# Patient Record
Sex: MALE | Race: WHITE | NOT HISPANIC OR LATINO | URBAN - METROPOLITAN AREA
[De-identification: names, ages, dates, MRNs, and addresses within clinical notes are randomized per-mention and may not be internally consistent; named-entity substitution may affect disease eponyms.]

---

## 2022-12-01 ENCOUNTER — EMERGENCY (EMERGENCY)
Facility: HOSPITAL | Age: 77
LOS: 1 days | Discharge: HOME | End: 2022-12-01
Attending: EMERGENCY MEDICINE
Payer: MEDICARE

## 2022-12-01 VITALS
OXYGEN SATURATION: 94 % | TEMPERATURE: 99 F | RESPIRATION RATE: 20 BRPM | HEART RATE: 92 BPM | WEIGHT: 195.11 LBS | SYSTOLIC BLOOD PRESSURE: 135 MMHG | DIASTOLIC BLOOD PRESSURE: 72 MMHG

## 2022-12-01 LAB
ALBUMIN SERPL ELPH-MCNC: 3.4 G/DL — LOW (ref 3.5–5)
ALP SERPL-CCNC: 58 U/L — SIGNIFICANT CHANGE UP (ref 40–120)
ALT FLD-CCNC: 30 U/L DA — SIGNIFICANT CHANGE UP (ref 10–60)
ANION GAP SERPL CALC-SCNC: 11 MMOL/L — SIGNIFICANT CHANGE UP (ref 5–17)
APTT BLD: 23.5 SEC — LOW (ref 27.5–35.5)
AST SERPL-CCNC: 26 U/L — SIGNIFICANT CHANGE UP (ref 10–40)
BILIRUB SERPL-MCNC: 0.3 MG/DL — SIGNIFICANT CHANGE UP (ref 0.2–1.2)
BUN SERPL-MCNC: 21 MG/DL — HIGH (ref 7–18)
CALCIUM SERPL-MCNC: 9.5 MG/DL — SIGNIFICANT CHANGE UP (ref 8.4–10.5)
CHLORIDE SERPL-SCNC: 98 MMOL/L — SIGNIFICANT CHANGE UP (ref 96–108)
CO2 SERPL-SCNC: 23 MMOL/L — SIGNIFICANT CHANGE UP (ref 22–31)
CREAT SERPL-MCNC: 1.38 MG/DL — HIGH (ref 0.5–1.3)
EGFR: 52 ML/MIN/1.73M2 — LOW
GLUCOSE SERPL-MCNC: 148 MG/DL — HIGH (ref 70–99)
HCT VFR BLD CALC: 31.9 % — LOW (ref 39–50)
HGB BLD-MCNC: 10.5 G/DL — LOW (ref 13–17)
INR BLD: 1.12 RATIO — SIGNIFICANT CHANGE UP (ref 0.88–1.16)
LACTATE SERPL-SCNC: 1.8 MMOL/L — SIGNIFICANT CHANGE UP (ref 0.7–2)
MCHC RBC-ENTMCNC: 28.4 PG — SIGNIFICANT CHANGE UP (ref 27–34)
MCHC RBC-ENTMCNC: 32.9 GM/DL — SIGNIFICANT CHANGE UP (ref 32–36)
MCV RBC AUTO: 86.2 FL — SIGNIFICANT CHANGE UP (ref 80–100)
NRBC # BLD: 0 /100 WBCS — SIGNIFICANT CHANGE UP (ref 0–0)
PLATELET # BLD AUTO: 264 K/UL — SIGNIFICANT CHANGE UP (ref 150–400)
POTASSIUM SERPL-MCNC: 4.6 MMOL/L — SIGNIFICANT CHANGE UP (ref 3.5–5.3)
POTASSIUM SERPL-SCNC: 4.6 MMOL/L — SIGNIFICANT CHANGE UP (ref 3.5–5.3)
PROT SERPL-MCNC: 8.6 G/DL — HIGH (ref 6–8.3)
PROTHROM AB SERPL-ACNC: 13.3 SEC — SIGNIFICANT CHANGE UP (ref 10.5–13.4)
RBC # BLD: 3.7 M/UL — LOW (ref 4.2–5.8)
RBC # FLD: 12.3 % — SIGNIFICANT CHANGE UP (ref 10.3–14.5)
SODIUM SERPL-SCNC: 132 MMOL/L — LOW (ref 135–145)
WBC # BLD: 9.16 K/UL — SIGNIFICANT CHANGE UP (ref 3.8–10.5)
WBC # FLD AUTO: 9.16 K/UL — SIGNIFICANT CHANGE UP (ref 3.8–10.5)

## 2022-12-01 PROCEDURE — 36415 COLL VENOUS BLD VENIPUNCTURE: CPT

## 2022-12-01 PROCEDURE — 99284 EMERGENCY DEPT VISIT MOD MDM: CPT | Mod: CS

## 2022-12-01 PROCEDURE — 93010 ELECTROCARDIOGRAM REPORT: CPT

## 2022-12-01 PROCEDURE — 99285 EMERGENCY DEPT VISIT HI MDM: CPT | Mod: 25

## 2022-12-01 PROCEDURE — 85610 PROTHROMBIN TIME: CPT

## 2022-12-01 PROCEDURE — 80053 COMPREHEN METABOLIC PANEL: CPT

## 2022-12-01 PROCEDURE — 83605 ASSAY OF LACTIC ACID: CPT

## 2022-12-01 PROCEDURE — 85027 COMPLETE CBC AUTOMATED: CPT

## 2022-12-01 PROCEDURE — 85730 THROMBOPLASTIN TIME PARTIAL: CPT

## 2022-12-01 PROCEDURE — 71045 X-RAY EXAM CHEST 1 VIEW: CPT | Mod: 26

## 2022-12-01 PROCEDURE — 71045 X-RAY EXAM CHEST 1 VIEW: CPT

## 2022-12-01 PROCEDURE — 93005 ELECTROCARDIOGRAM TRACING: CPT

## 2022-12-01 RX ORDER — ACETAMINOPHEN 500 MG
975 TABLET ORAL ONCE
Refills: 0 | Status: COMPLETED | OUTPATIENT
Start: 2022-12-01 | End: 2022-12-01

## 2022-12-01 RX ORDER — SODIUM CHLORIDE 9 MG/ML
1000 INJECTION INTRAMUSCULAR; INTRAVENOUS; SUBCUTANEOUS ONCE
Refills: 0 | Status: COMPLETED | OUTPATIENT
Start: 2022-12-01 | End: 2022-12-01

## 2022-12-01 RX ADMIN — SODIUM CHLORIDE 1000 MILLILITER(S): 9 INJECTION INTRAMUSCULAR; INTRAVENOUS; SUBCUTANEOUS at 21:14

## 2022-12-01 RX ADMIN — Medication 975 MILLIGRAM(S): at 21:13

## 2022-12-01 NOTE — ED PROVIDER NOTE - PATIENT PORTAL LINK FT
You can access the FollowMyHealth Patient Portal offered by [facility] by registering at the following website: [email or web address]. By joining [facility]’s FollowMyHealth portal, you will also be able to view your health information using other applications (apps) compatible with our system.

## 2022-12-01 NOTE — ED PROVIDER NOTE - PROGRESS NOTE DETAILS
EKG - nsr, rate 96, QTc 419, no ischemic changes, no ectopy labs - Hgb 10, Cr 1.38  CXR - lungs clear  Not hypoxic.   Cannot take paxlovid 2/2 to statin use  Dc supportive care and out pt fu  Discussed indications for patient return to ED. Patient understood.

## 2022-12-01 NOTE — ED PROVIDER NOTE - PHYSICAL EXAMINATION
GENERAL: well appearing, no acute distress   HEAD: atraumatic   EYES: EOMI   ENT: moist oral mucosa   CARDIAC: regular rate  RESPIRATORY: no increased work of breathing, lungs clear   ABDO: soft  MUSCULOSKELETAL: no deformity   NEUROLOGICAL: alert, spontaneous movement of extremities   SKIN: no visible rash  PSYCHIATRIC: cooperative

## 2022-12-01 NOTE — ED PROVIDER NOTE - OBJECTIVE STATEMENT
78 yo M pmh of DM, HTN, HLD presents with covid. Family reports 2 weeks of fever. covid+ test yesterday. c/o weakness. Denies other acute complaints. Not vaccinated against covid. Had covid in Feb 2022. [place] translation via family at bedside.

## 2022-12-02 VITALS — TEMPERATURE: 98 F

## 2024-07-02 ENCOUNTER — TRANSCRIPTION ENCOUNTER (OUTPATIENT)
Age: 79
End: 2024-07-02

## 2024-07-02 ENCOUNTER — INPATIENT (INPATIENT)
Facility: HOSPITAL | Age: 79
LOS: 1 days | Discharge: HOME | DRG: 399 | End: 2024-07-04
Attending: SURGERY | Admitting: SURGERY
Payer: MEDICARE

## 2024-07-02 VITALS
HEIGHT: 67 IN | TEMPERATURE: 98 F | HEART RATE: 79 BPM | WEIGHT: 184.97 LBS | OXYGEN SATURATION: 96 % | DIASTOLIC BLOOD PRESSURE: 65 MMHG | SYSTOLIC BLOOD PRESSURE: 102 MMHG | RESPIRATION RATE: 17 BRPM

## 2024-07-02 DIAGNOSIS — K35.80 UNSPECIFIED ACUTE APPENDICITIS: ICD-10-CM

## 2024-07-02 LAB
ALBUMIN SERPL ELPH-MCNC: 3.7 G/DL — SIGNIFICANT CHANGE UP (ref 3.5–5)
ALP SERPL-CCNC: 42 U/L — SIGNIFICANT CHANGE UP (ref 40–120)
ALT FLD-CCNC: 20 U/L DA — SIGNIFICANT CHANGE UP (ref 10–60)
ANION GAP SERPL CALC-SCNC: 7 MMOL/L — SIGNIFICANT CHANGE UP (ref 5–17)
APPEARANCE UR: CLEAR — SIGNIFICANT CHANGE UP
APTT BLD: 25.9 SEC — SIGNIFICANT CHANGE UP (ref 24.5–35.6)
AST SERPL-CCNC: 12 U/L — SIGNIFICANT CHANGE UP (ref 10–40)
BASOPHILS # BLD AUTO: 0.02 K/UL — SIGNIFICANT CHANGE UP (ref 0–0.2)
BASOPHILS NFR BLD AUTO: 0.2 % — SIGNIFICANT CHANGE UP (ref 0–2)
BILIRUB SERPL-MCNC: 0.3 MG/DL — SIGNIFICANT CHANGE UP (ref 0.2–1.2)
BILIRUB UR-MCNC: NEGATIVE — SIGNIFICANT CHANGE UP
BLD GP AB SCN SERPL QL: SIGNIFICANT CHANGE UP
BUN SERPL-MCNC: 30 MG/DL — HIGH (ref 7–18)
CALCIUM SERPL-MCNC: 8.9 MG/DL — SIGNIFICANT CHANGE UP (ref 8.4–10.5)
CHLORIDE SERPL-SCNC: 104 MMOL/L — SIGNIFICANT CHANGE UP (ref 96–108)
CK SERPL-CCNC: 47 U/L — SIGNIFICANT CHANGE UP (ref 35–232)
CO2 SERPL-SCNC: 26 MMOL/L — SIGNIFICANT CHANGE UP (ref 22–31)
COLOR SPEC: YELLOW — SIGNIFICANT CHANGE UP
CREAT SERPL-MCNC: 1.02 MG/DL — SIGNIFICANT CHANGE UP (ref 0.5–1.3)
DIFF PNL FLD: NEGATIVE — SIGNIFICANT CHANGE UP
EGFR: 74 ML/MIN/1.73M2 — SIGNIFICANT CHANGE UP
EOSINOPHIL # BLD AUTO: 0.01 K/UL — SIGNIFICANT CHANGE UP (ref 0–0.5)
EOSINOPHIL NFR BLD AUTO: 0.1 % — SIGNIFICANT CHANGE UP (ref 0–6)
GLUCOSE BLDC GLUCOMTR-MCNC: 119 MG/DL — HIGH (ref 70–99)
GLUCOSE BLDC GLUCOMTR-MCNC: 123 MG/DL — HIGH (ref 70–99)
GLUCOSE SERPL-MCNC: 135 MG/DL — HIGH (ref 70–99)
GLUCOSE UR QL: NEGATIVE MG/DL — SIGNIFICANT CHANGE UP
HCT VFR BLD CALC: 26.3 % — LOW (ref 39–50)
HCT VFR BLD CALC: 29.4 % — LOW (ref 39–50)
HGB BLD-MCNC: 8.7 G/DL — LOW (ref 13–17)
HGB BLD-MCNC: 9.8 G/DL — LOW (ref 13–17)
IMM GRANULOCYTES NFR BLD AUTO: 0.6 % — SIGNIFICANT CHANGE UP (ref 0–0.9)
INR BLD: 1 RATIO — SIGNIFICANT CHANGE UP (ref 0.85–1.18)
KETONES UR-MCNC: 15 MG/DL
LACTATE SERPL-SCNC: 1.7 MMOL/L — SIGNIFICANT CHANGE UP (ref 0.7–2)
LEUKOCYTE ESTERASE UR-ACNC: NEGATIVE — SIGNIFICANT CHANGE UP
LIDOCAIN IGE QN: 42 U/L — SIGNIFICANT CHANGE UP (ref 13–75)
LYMPHOCYTES # BLD AUTO: 1.39 K/UL — SIGNIFICANT CHANGE UP (ref 1–3.3)
LYMPHOCYTES # BLD AUTO: 13.4 % — SIGNIFICANT CHANGE UP (ref 13–44)
MCHC RBC-ENTMCNC: 29.4 PG — SIGNIFICANT CHANGE UP (ref 27–34)
MCHC RBC-ENTMCNC: 29.5 PG — SIGNIFICANT CHANGE UP (ref 27–34)
MCHC RBC-ENTMCNC: 33.1 GM/DL — SIGNIFICANT CHANGE UP (ref 32–36)
MCHC RBC-ENTMCNC: 33.3 GM/DL — SIGNIFICANT CHANGE UP (ref 32–36)
MCV RBC AUTO: 88.6 FL — SIGNIFICANT CHANGE UP (ref 80–100)
MCV RBC AUTO: 88.9 FL — SIGNIFICANT CHANGE UP (ref 80–100)
MONOCYTES # BLD AUTO: 0.84 K/UL — SIGNIFICANT CHANGE UP (ref 0–0.9)
MONOCYTES NFR BLD AUTO: 8.1 % — SIGNIFICANT CHANGE UP (ref 2–14)
NEUTROPHILS # BLD AUTO: 8.02 K/UL — HIGH (ref 1.8–7.4)
NEUTROPHILS NFR BLD AUTO: 77.6 % — HIGH (ref 43–77)
NITRITE UR-MCNC: NEGATIVE — SIGNIFICANT CHANGE UP
NRBC # BLD: 0 /100 WBCS — SIGNIFICANT CHANGE UP (ref 0–0)
NRBC # BLD: 0 /100 WBCS — SIGNIFICANT CHANGE UP (ref 0–0)
PH UR: 7 — SIGNIFICANT CHANGE UP (ref 5–8)
PLATELET # BLD AUTO: 216 K/UL — SIGNIFICANT CHANGE UP (ref 150–400)
PLATELET # BLD AUTO: 226 K/UL — SIGNIFICANT CHANGE UP (ref 150–400)
POTASSIUM SERPL-MCNC: 4 MMOL/L — SIGNIFICANT CHANGE UP (ref 3.5–5.3)
POTASSIUM SERPL-SCNC: 4 MMOL/L — SIGNIFICANT CHANGE UP (ref 3.5–5.3)
PROT SERPL-MCNC: 7.2 G/DL — SIGNIFICANT CHANGE UP (ref 6–8.3)
PROT UR-MCNC: NEGATIVE MG/DL — SIGNIFICANT CHANGE UP
PROTHROM AB SERPL-ACNC: 11.4 SEC — SIGNIFICANT CHANGE UP (ref 9.5–13)
RBC # BLD: 2.96 M/UL — LOW (ref 4.2–5.8)
RBC # BLD: 3.32 M/UL — LOW (ref 4.2–5.8)
RBC # FLD: 13.9 % — SIGNIFICANT CHANGE UP (ref 10.3–14.5)
RBC # FLD: 13.9 % — SIGNIFICANT CHANGE UP (ref 10.3–14.5)
SODIUM SERPL-SCNC: 137 MMOL/L — SIGNIFICANT CHANGE UP (ref 135–145)
SP GR SPEC: 1.02 — SIGNIFICANT CHANGE UP (ref 1–1.03)
TROPONIN I, HIGH SENSITIVITY RESULT: 4.2 NG/L — SIGNIFICANT CHANGE UP
UROBILINOGEN FLD QL: 1 MG/DL — SIGNIFICANT CHANGE UP (ref 0.2–1)
WBC # BLD: 10.34 K/UL — SIGNIFICANT CHANGE UP (ref 3.8–10.5)
WBC # BLD: 8.68 K/UL — SIGNIFICANT CHANGE UP (ref 3.8–10.5)
WBC # FLD AUTO: 10.34 K/UL — SIGNIFICANT CHANGE UP (ref 3.8–10.5)
WBC # FLD AUTO: 8.68 K/UL — SIGNIFICANT CHANGE UP (ref 3.8–10.5)

## 2024-07-02 PROCEDURE — 99223 1ST HOSP IP/OBS HIGH 75: CPT

## 2024-07-02 PROCEDURE — 99285 EMERGENCY DEPT VISIT HI MDM: CPT | Mod: GC

## 2024-07-02 PROCEDURE — 74177 CT ABD & PELVIS W/CONTRAST: CPT | Mod: 26,MC

## 2024-07-02 PROCEDURE — 99222 1ST HOSP IP/OBS MODERATE 55: CPT | Mod: 57

## 2024-07-02 RX ORDER — LISINOPRIL 5 MG/1
10 TABLET ORAL DAILY
Refills: 0 | Status: DISCONTINUED | OUTPATIENT
Start: 2024-07-02 | End: 2024-07-04

## 2024-07-02 RX ORDER — PANTOPRAZOLE SODIUM 40 MG/10ML
40 INJECTION, POWDER, FOR SOLUTION INTRAVENOUS DAILY
Refills: 0 | Status: DISCONTINUED | OUTPATIENT
Start: 2024-07-02 | End: 2024-07-02

## 2024-07-02 RX ORDER — DEXTROSE MONOHYDRATE 100 MG/ML
125 INJECTION, SOLUTION INTRAVENOUS ONCE
Refills: 0 | Status: DISCONTINUED | OUTPATIENT
Start: 2024-07-02 | End: 2024-07-03

## 2024-07-02 RX ORDER — SODIUM CHLORIDE 0.9 % (FLUSH) 0.9 %
500 SYRINGE (ML) INJECTION ONCE
Refills: 0 | Status: COMPLETED | OUTPATIENT
Start: 2024-07-02 | End: 2024-07-02

## 2024-07-02 RX ORDER — TAMSULOSIN HYDROCHLORIDE 0.4 MG/1
1 CAPSULE ORAL
Refills: 0 | DISCHARGE

## 2024-07-02 RX ORDER — METFORMIN HYDROCHLORIDE 850 MG/1
1 TABLET, FILM COATED ORAL
Refills: 0 | DISCHARGE

## 2024-07-02 RX ORDER — DEXTROSE MONOHYDRATE AND SODIUM CHLORIDE 5; .3 G/100ML; G/100ML
1000 INJECTION, SOLUTION INTRAVENOUS
Refills: 0 | Status: DISCONTINUED | OUTPATIENT
Start: 2024-07-02 | End: 2024-07-03

## 2024-07-02 RX ORDER — VIBEGRON 75 MG/1
1 TABLET, FILM COATED ORAL
Refills: 0 | DISCHARGE

## 2024-07-02 RX ORDER — PALIPERIDONE 3 MG/1
0 TABLET, EXTENDED RELEASE ORAL
Refills: 0 | DISCHARGE

## 2024-07-02 RX ORDER — TAMSULOSIN HYDROCHLORIDE 0.4 MG/1
0.4 CAPSULE ORAL AT BEDTIME
Refills: 0 | Status: DISCONTINUED | OUTPATIENT
Start: 2024-07-02 | End: 2024-07-04

## 2024-07-02 RX ORDER — ONDANSETRON HYDROCHLORIDE 2 MG/ML
4 INJECTION INTRAMUSCULAR; INTRAVENOUS ONCE
Refills: 0 | Status: COMPLETED | OUTPATIENT
Start: 2024-07-02 | End: 2024-07-02

## 2024-07-02 RX ORDER — PANTOPRAZOLE SODIUM 40 MG/10ML
40 INJECTION, POWDER, FOR SOLUTION INTRAVENOUS EVERY 12 HOURS
Refills: 0 | Status: DISCONTINUED | OUTPATIENT
Start: 2024-07-02 | End: 2024-07-04

## 2024-07-02 RX ORDER — LINACLOTIDE 290 UG/1
1 CAPSULE, GELATIN COATED ORAL
Refills: 0 | DISCHARGE

## 2024-07-02 RX ORDER — LISINOPRIL 5 MG/1
1 TABLET ORAL
Refills: 0 | DISCHARGE

## 2024-07-02 RX ORDER — GLUCAGON HYDROCHLORIDE 1 MG/ML
1 INJECTION, POWDER, FOR SOLUTION INTRAMUSCULAR; INTRAVENOUS; SUBCUTANEOUS ONCE
Refills: 0 | Status: DISCONTINUED | OUTPATIENT
Start: 2024-07-02 | End: 2024-07-03

## 2024-07-02 RX ORDER — PANTOPRAZOLE SODIUM 40 MG/10ML
80 INJECTION, POWDER, FOR SOLUTION INTRAVENOUS ONCE
Refills: 0 | Status: COMPLETED | OUTPATIENT
Start: 2024-07-02 | End: 2024-07-02

## 2024-07-02 RX ORDER — AMLODIPINE BESYLATE 2.5 MG/1
5 TABLET ORAL DAILY
Refills: 0 | Status: DISCONTINUED | OUTPATIENT
Start: 2024-07-02 | End: 2024-07-04

## 2024-07-02 RX ORDER — DEXTROSE 30 % IN WATER 30 %
12.5 VIAL (ML) INTRAVENOUS ONCE
Refills: 0 | Status: DISCONTINUED | OUTPATIENT
Start: 2024-07-02 | End: 2024-07-03

## 2024-07-02 RX ORDER — ROSUVASTATIN CALCIUM 20 MG/1
1 TABLET ORAL
Refills: 0 | DISCHARGE

## 2024-07-02 RX ORDER — AMLODIPINE BESYLATE 2.5 MG/1
1 TABLET ORAL
Refills: 0 | DISCHARGE

## 2024-07-02 RX ORDER — ASPIRIN 325 MG/1
1 TABLET, FILM COATED ORAL
Refills: 0 | DISCHARGE

## 2024-07-02 RX ORDER — INSULIN LISPRO 100 [IU]/ML
INJECTION, SOLUTION SUBCUTANEOUS EVERY 6 HOURS
Refills: 0 | Status: DISCONTINUED | OUTPATIENT
Start: 2024-07-02 | End: 2024-07-03

## 2024-07-02 RX ORDER — VIBEGRON 75 MG/1
1 TABLET, FILM COATED ORAL
Qty: 1 | Refills: 0
Start: 2024-07-02

## 2024-07-02 RX ORDER — SODIUM CHLORIDE 0.9 % (FLUSH) 0.9 %
1000 SYRINGE (ML) INJECTION
Refills: 0 | Status: DISCONTINUED | OUTPATIENT
Start: 2024-07-02 | End: 2024-07-04

## 2024-07-02 RX ORDER — DEXTROSE 30 % IN WATER 30 %
15 VIAL (ML) INTRAVENOUS ONCE
Refills: 0 | Status: DISCONTINUED | OUTPATIENT
Start: 2024-07-02 | End: 2024-07-03

## 2024-07-02 RX ORDER — OMEPRAZOLE 10 MG/1
1 CAPSULE, DELAYED RELEASE ORAL
Refills: 0 | DISCHARGE

## 2024-07-02 RX ORDER — ACETAMINOPHEN 325 MG
650 TABLET ORAL EVERY 6 HOURS
Refills: 0 | Status: DISCONTINUED | OUTPATIENT
Start: 2024-07-02 | End: 2024-07-04

## 2024-07-02 RX ORDER — DEXTROSE 30 % IN WATER 30 %
25 VIAL (ML) INTRAVENOUS ONCE
Refills: 0 | Status: DISCONTINUED | OUTPATIENT
Start: 2024-07-02 | End: 2024-07-03

## 2024-07-02 RX ORDER — OLANZAPINE 2.5 MG/1
0 TABLET, FILM COATED ORAL
Refills: 0 | DISCHARGE

## 2024-07-02 RX ORDER — ONDANSETRON HYDROCHLORIDE 2 MG/ML
4 INJECTION INTRAMUSCULAR; INTRAVENOUS EVERY 6 HOURS
Refills: 0 | Status: DISCONTINUED | OUTPATIENT
Start: 2024-07-02 | End: 2024-07-04

## 2024-07-02 RX ORDER — HYDROMORPHONE HCL 0.2 MG/ML
0.5 INJECTION, SOLUTION INTRAVENOUS EVERY 4 HOURS
Refills: 0 | Status: DISCONTINUED | OUTPATIENT
Start: 2024-07-02 | End: 2024-07-04

## 2024-07-02 RX ADMIN — TAMSULOSIN HYDROCHLORIDE 0.4 MILLIGRAM(S): 0.4 CAPSULE ORAL at 21:54

## 2024-07-02 RX ADMIN — ONDANSETRON HYDROCHLORIDE 4 MILLIGRAM(S): 2 INJECTION INTRAMUSCULAR; INTRAVENOUS at 09:18

## 2024-07-02 RX ADMIN — PANTOPRAZOLE SODIUM 40 MILLIGRAM(S): 40 INJECTION, POWDER, FOR SOLUTION INTRAVENOUS at 18:46

## 2024-07-02 RX ADMIN — Medication 500 MILLILITER(S): at 09:17

## 2024-07-02 RX ADMIN — Medication 3 MILLIGRAM(S): at 21:54

## 2024-07-02 RX ADMIN — PANTOPRAZOLE SODIUM 80 MILLIGRAM(S): 40 INJECTION, POWDER, FOR SOLUTION INTRAVENOUS at 09:18

## 2024-07-03 ENCOUNTER — TRANSCRIPTION ENCOUNTER (OUTPATIENT)
Age: 79
End: 2024-07-03

## 2024-07-03 ENCOUNTER — RESULT REVIEW (OUTPATIENT)
Age: 79
End: 2024-07-03

## 2024-07-03 LAB
A1C WITH ESTIMATED AVERAGE GLUCOSE RESULT: 5.9 % — HIGH (ref 4–5.6)
ABO RH CONFIRMATION: SIGNIFICANT CHANGE UP
ANION GAP SERPL CALC-SCNC: 7 MMOL/L — SIGNIFICANT CHANGE UP (ref 5–17)
BUN SERPL-MCNC: 17 MG/DL — SIGNIFICANT CHANGE UP (ref 7–18)
CALCIUM SERPL-MCNC: 8.3 MG/DL — LOW (ref 8.4–10.5)
CHLORIDE SERPL-SCNC: 106 MMOL/L — SIGNIFICANT CHANGE UP (ref 96–108)
CO2 SERPL-SCNC: 24 MMOL/L — SIGNIFICANT CHANGE UP (ref 22–31)
CREAT SERPL-MCNC: 0.91 MG/DL — SIGNIFICANT CHANGE UP (ref 0.5–1.3)
EGFR: 85 ML/MIN/1.73M2 — SIGNIFICANT CHANGE UP
ESTIMATED AVERAGE GLUCOSE: 123 MG/DL — HIGH (ref 68–114)
GLUCOSE BLDC GLUCOMTR-MCNC: 123 MG/DL — HIGH (ref 70–99)
GLUCOSE BLDC GLUCOMTR-MCNC: 123 MG/DL — HIGH (ref 70–99)
GLUCOSE BLDC GLUCOMTR-MCNC: 124 MG/DL — HIGH (ref 70–99)
GLUCOSE BLDC GLUCOMTR-MCNC: 124 MG/DL — HIGH (ref 70–99)
GLUCOSE BLDC GLUCOMTR-MCNC: 132 MG/DL — HIGH (ref 70–99)
GLUCOSE BLDC GLUCOMTR-MCNC: 136 MG/DL — HIGH (ref 70–99)
GLUCOSE SERPL-MCNC: 123 MG/DL — HIGH (ref 70–99)
HCT VFR BLD CALC: 26.1 % — LOW (ref 39–50)
HGB BLD-MCNC: 8.4 G/DL — LOW (ref 13–17)
MCHC RBC-ENTMCNC: 29.1 PG — SIGNIFICANT CHANGE UP (ref 27–34)
MCHC RBC-ENTMCNC: 32.2 GM/DL — SIGNIFICANT CHANGE UP (ref 32–36)
MCV RBC AUTO: 90.3 FL — SIGNIFICANT CHANGE UP (ref 80–100)
NRBC # BLD: 0 /100 WBCS — SIGNIFICANT CHANGE UP (ref 0–0)
PLATELET # BLD AUTO: 203 K/UL — SIGNIFICANT CHANGE UP (ref 150–400)
POTASSIUM SERPL-MCNC: 4.1 MMOL/L — SIGNIFICANT CHANGE UP (ref 3.5–5.3)
POTASSIUM SERPL-SCNC: 4.1 MMOL/L — SIGNIFICANT CHANGE UP (ref 3.5–5.3)
RBC # BLD: 2.89 M/UL — LOW (ref 4.2–5.8)
RBC # FLD: 14 % — SIGNIFICANT CHANGE UP (ref 10.3–14.5)
SODIUM SERPL-SCNC: 137 MMOL/L — SIGNIFICANT CHANGE UP (ref 135–145)
WBC # BLD: 6.23 K/UL — SIGNIFICANT CHANGE UP (ref 3.8–10.5)
WBC # FLD AUTO: 6.23 K/UL — SIGNIFICANT CHANGE UP (ref 3.8–10.5)

## 2024-07-03 PROCEDURE — 99223 1ST HOSP IP/OBS HIGH 75: CPT

## 2024-07-03 PROCEDURE — 88341 IMHCHEM/IMCYTCHM EA ADD ANTB: CPT | Mod: 26

## 2024-07-03 PROCEDURE — 88304 TISSUE EXAM BY PATHOLOGIST: CPT | Mod: 26

## 2024-07-03 PROCEDURE — 44970 LAPAROSCOPY APPENDECTOMY: CPT

## 2024-07-03 PROCEDURE — 99233 SBSQ HOSP IP/OBS HIGH 50: CPT | Mod: GC

## 2024-07-03 PROCEDURE — 44970 LAPAROSCOPY APPENDECTOMY: CPT | Mod: AS

## 2024-07-03 PROCEDURE — 88342 IMHCHEM/IMCYTCHM 1ST ANTB: CPT | Mod: 26

## 2024-07-03 DEVICE — STAPLER COVIDIEN TRI-STAPLE 45MM PURPLE RELOAD: Type: IMPLANTABLE DEVICE | Status: FUNCTIONAL

## 2024-07-03 RX ORDER — FENTANYL CITRATE 50 UG/ML
25 INJECTION, SOLUTION INTRAMUSCULAR; INTRAVENOUS
Refills: 0 | Status: DISCONTINUED | OUTPATIENT
Start: 2024-07-03 | End: 2024-07-03

## 2024-07-03 RX ORDER — INSULIN LISPRO 100 [IU]/ML
INJECTION, SOLUTION SUBCUTANEOUS AT BEDTIME
Refills: 0 | Status: DISCONTINUED | OUTPATIENT
Start: 2024-07-03 | End: 2024-07-04

## 2024-07-03 RX ORDER — FENTANYL CITRATE 50 UG/ML
50 INJECTION, SOLUTION INTRAMUSCULAR; INTRAVENOUS
Refills: 0 | Status: DISCONTINUED | OUTPATIENT
Start: 2024-07-03 | End: 2024-07-03

## 2024-07-03 RX ORDER — DEXTROSE MONOHYDRATE AND SODIUM CHLORIDE 5; .3 G/100ML; G/100ML
1000 INJECTION, SOLUTION INTRAVENOUS
Refills: 0 | Status: DISCONTINUED | OUTPATIENT
Start: 2024-07-03 | End: 2024-07-04

## 2024-07-03 RX ORDER — DEXTROSE 30 % IN WATER 30 %
15 VIAL (ML) INTRAVENOUS ONCE
Refills: 0 | Status: DISCONTINUED | OUTPATIENT
Start: 2024-07-03 | End: 2024-07-04

## 2024-07-03 RX ORDER — GLUCAGON HYDROCHLORIDE 1 MG/ML
1 INJECTION, POWDER, FOR SOLUTION INTRAMUSCULAR; INTRAVENOUS; SUBCUTANEOUS ONCE
Refills: 0 | Status: DISCONTINUED | OUTPATIENT
Start: 2024-07-03 | End: 2024-07-04

## 2024-07-03 RX ORDER — DEXTROSE 30 % IN WATER 30 %
25 VIAL (ML) INTRAVENOUS ONCE
Refills: 0 | Status: DISCONTINUED | OUTPATIENT
Start: 2024-07-03 | End: 2024-07-04

## 2024-07-03 RX ORDER — DEXTROSE 30 % IN WATER 30 %
12.5 VIAL (ML) INTRAVENOUS ONCE
Refills: 0 | Status: DISCONTINUED | OUTPATIENT
Start: 2024-07-03 | End: 2024-07-04

## 2024-07-03 RX ORDER — INSULIN LISPRO 100 [IU]/ML
INJECTION, SOLUTION SUBCUTANEOUS
Refills: 0 | Status: DISCONTINUED | OUTPATIENT
Start: 2024-07-03 | End: 2024-07-04

## 2024-07-03 RX ORDER — DEXTROSE MONOHYDRATE 100 MG/ML
125 INJECTION, SOLUTION INTRAVENOUS ONCE
Refills: 0 | Status: DISCONTINUED | OUTPATIENT
Start: 2024-07-03 | End: 2024-07-04

## 2024-07-03 RX ADMIN — PANTOPRAZOLE SODIUM 40 MILLIGRAM(S): 40 INJECTION, POWDER, FOR SOLUTION INTRAVENOUS at 05:36

## 2024-07-03 RX ADMIN — AMLODIPINE BESYLATE 5 MILLIGRAM(S): 2.5 TABLET ORAL at 05:36

## 2024-07-03 RX ADMIN — Medication 80 MILLILITER(S): at 05:37

## 2024-07-03 RX ADMIN — LISINOPRIL 10 MILLIGRAM(S): 5 TABLET ORAL at 05:37

## 2024-07-03 RX ADMIN — Medication 3 MILLIGRAM(S): at 21:29

## 2024-07-03 RX ADMIN — Medication 80 MILLILITER(S): at 20:12

## 2024-07-03 RX ADMIN — PANTOPRAZOLE SODIUM 40 MILLIGRAM(S): 40 INJECTION, POWDER, FOR SOLUTION INTRAVENOUS at 17:20

## 2024-07-03 RX ADMIN — TAMSULOSIN HYDROCHLORIDE 0.4 MILLIGRAM(S): 0.4 CAPSULE ORAL at 21:29

## 2024-07-04 ENCOUNTER — TRANSCRIPTION ENCOUNTER (OUTPATIENT)
Age: 79
End: 2024-07-04

## 2024-07-04 VITALS
HEART RATE: 62 BPM | SYSTOLIC BLOOD PRESSURE: 110 MMHG | DIASTOLIC BLOOD PRESSURE: 62 MMHG | RESPIRATION RATE: 16 BRPM | TEMPERATURE: 98 F | OXYGEN SATURATION: 95 %

## 2024-07-04 LAB
GLUCOSE BLDC GLUCOMTR-MCNC: 132 MG/DL — HIGH (ref 70–99)
GLUCOSE BLDC GLUCOMTR-MCNC: 133 MG/DL — HIGH (ref 70–99)

## 2024-07-04 PROCEDURE — 93005 ELECTROCARDIOGRAM TRACING: CPT

## 2024-07-04 PROCEDURE — 99285 EMERGENCY DEPT VISIT HI MDM: CPT

## 2024-07-04 PROCEDURE — 85025 COMPLETE CBC W/AUTO DIFF WBC: CPT

## 2024-07-04 PROCEDURE — C1889: CPT

## 2024-07-04 PROCEDURE — 84484 ASSAY OF TROPONIN QUANT: CPT

## 2024-07-04 PROCEDURE — 83036 HEMOGLOBIN GLYCOSYLATED A1C: CPT

## 2024-07-04 PROCEDURE — 96375 TX/PRO/DX INJ NEW DRUG ADDON: CPT

## 2024-07-04 PROCEDURE — 86900 BLOOD TYPING SEROLOGIC ABO: CPT

## 2024-07-04 PROCEDURE — 96376 TX/PRO/DX INJ SAME DRUG ADON: CPT

## 2024-07-04 PROCEDURE — 88304 TISSUE EXAM BY PATHOLOGIST: CPT

## 2024-07-04 PROCEDURE — 85730 THROMBOPLASTIN TIME PARTIAL: CPT

## 2024-07-04 PROCEDURE — 96374 THER/PROPH/DIAG INJ IV PUSH: CPT

## 2024-07-04 PROCEDURE — 99232 SBSQ HOSP IP/OBS MODERATE 35: CPT | Mod: GC

## 2024-07-04 PROCEDURE — 81003 URINALYSIS AUTO W/O SCOPE: CPT

## 2024-07-04 PROCEDURE — 83605 ASSAY OF LACTIC ACID: CPT

## 2024-07-04 PROCEDURE — 85610 PROTHROMBIN TIME: CPT

## 2024-07-04 PROCEDURE — 36415 COLL VENOUS BLD VENIPUNCTURE: CPT

## 2024-07-04 PROCEDURE — 74177 CT ABD & PELVIS W/CONTRAST: CPT | Mod: MC

## 2024-07-04 PROCEDURE — 82550 ASSAY OF CK (CPK): CPT

## 2024-07-04 PROCEDURE — 93306 TTE W/DOPPLER COMPLETE: CPT

## 2024-07-04 PROCEDURE — T1013: CPT

## 2024-07-04 PROCEDURE — 86901 BLOOD TYPING SEROLOGIC RH(D): CPT

## 2024-07-04 PROCEDURE — 80053 COMPREHEN METABOLIC PANEL: CPT

## 2024-07-04 PROCEDURE — 88342 IMHCHEM/IMCYTCHM 1ST ANTB: CPT

## 2024-07-04 PROCEDURE — 85027 COMPLETE CBC AUTOMATED: CPT

## 2024-07-04 PROCEDURE — 88341 IMHCHEM/IMCYTCHM EA ADD ANTB: CPT

## 2024-07-04 PROCEDURE — 82962 GLUCOSE BLOOD TEST: CPT

## 2024-07-04 PROCEDURE — 86850 RBC ANTIBODY SCREEN: CPT

## 2024-07-04 PROCEDURE — 80048 BASIC METABOLIC PNL TOTAL CA: CPT

## 2024-07-04 PROCEDURE — 83690 ASSAY OF LIPASE: CPT

## 2024-07-04 RX ADMIN — PANTOPRAZOLE SODIUM 40 MILLIGRAM(S): 40 INJECTION, POWDER, FOR SOLUTION INTRAVENOUS at 05:40

## 2024-07-16 LAB — SURGICAL PATHOLOGY STUDY: SIGNIFICANT CHANGE UP

## 2024-07-18 ENCOUNTER — NON-APPOINTMENT (OUTPATIENT)
Age: 79
End: 2024-07-18

## 2024-07-19 PROBLEM — K21.9 GASTRO-ESOPHAGEAL REFLUX DISEASE WITHOUT ESOPHAGITIS: Chronic | Status: ACTIVE | Noted: 2024-07-02

## 2024-07-19 PROBLEM — E11.9 TYPE 2 DIABETES MELLITUS WITHOUT COMPLICATIONS: Chronic | Status: ACTIVE | Noted: 2024-07-02

## 2024-07-19 PROBLEM — B19.20 UNSPECIFIED VIRAL HEPATITIS C WITHOUT HEPATIC COMA: Chronic | Status: ACTIVE | Noted: 2024-07-02

## 2024-07-21 ENCOUNTER — NON-APPOINTMENT (OUTPATIENT)
Age: 79
End: 2024-07-21

## 2024-07-22 ENCOUNTER — APPOINTMENT (OUTPATIENT)
Dept: SURGERY | Facility: CLINIC | Age: 79
End: 2024-07-22
Payer: MEDICARE

## 2024-07-22 DIAGNOSIS — C18.1 MALIGNANT NEOPLASM OF APPENDIX: ICD-10-CM

## 2024-07-22 PROCEDURE — 99024 POSTOP FOLLOW-UP VISIT: CPT

## 2024-07-22 RX ORDER — CIPROFLOXACIN HYDROCHLORIDE 500 MG/1
500 TABLET, FILM COATED ORAL
Qty: 2 | Refills: 0 | Status: ACTIVE | COMMUNITY
Start: 2024-07-22 | End: 1900-01-01

## 2024-07-22 RX ORDER — POLYETHYLENE GLYCOL 3350 AND ELECTROLYTES WITH LEMON FLAVOR 236; 22.74; 6.74; 5.86; 2.97 G/4L; G/4L; G/4L; G/4L; G/4L
236 POWDER, FOR SOLUTION ORAL
Qty: 1 | Refills: 0 | Status: ACTIVE | COMMUNITY
Start: 2024-07-22 | End: 1900-01-01

## 2024-07-22 RX ORDER — METRONIDAZOLE 500 MG/1
500 TABLET ORAL
Qty: 2 | Refills: 0 | Status: ACTIVE | COMMUNITY
Start: 2024-07-22 | End: 1900-01-01

## 2024-07-22 NOTE — PHYSICAL EXAM
[No Rash or Lesion] : No rash or lesion [Alert] : alert [Oriented to Person] : oriented to person [Oriented to Place] : oriented to place [Oriented to Time] : oriented to time [Calm] : calm [de-identified] : A/Ox3; NAD. appears comfortable [de-identified] : EOMI; sclera anicteric. [de-identified] : airway patent, no use of accessory muscles [de-identified] : abd soft NTND well healing surgical sites, no evidence of acute inflammation or infection

## 2024-07-22 NOTE — REASON FOR VISIT
[Post Op: _________] : a [unfilled] post op visit [Spouse] : spouse [FreeTextEntry1] : S/P laparoscopic appendectomy 07/03/24

## 2024-07-22 NOTE — CONSULT LETTER
[Dear  ___] : Dear  [unfilled], [Consult Letter:] : I had the pleasure of evaluating your patient, [unfilled]. [Consult Closing:] : Thank you very much for allowing me to participate in the care of this patient.  If you have any questions, please do not hesitate to contact me. [Sincerely,] : Sincerely, [FreeTextEntry3] : Stewart Ch MD General Surgery

## 2024-07-22 NOTE — ASSESSMENT
[FreeTextEntry1] : IMP: 80 yo M S/P laparoscopic appendectomy 07/03/24-- Final Path: Invasive adenocarcinoma, moderately differentiated, appendix.    All surgical incisions are healing well and as expected. There is no evidence of infection or complication, and patient is progressing as expected.

## 2024-07-22 NOTE — HISTORY OF PRESENT ILLNESS
[de-identified] : Mr. SAMUEL is a 79 year y/o Moldovan Speaking M w PMH HCV, HTN, T2DM, HLD, admitted to [facility] 07/02- 07/04 w acute appendicitis, S/P laparoscopic appendectomy 07/03/24;  Patient of Dr. Quirino Stark- GI.  Final Path: Invasive adenocarcinoma, moderately differentiated, appendix.   Prior to surgery, pt notes bloody emesis and not feeling well. Today, he is feeling OK- has a decreased appetite and eats small meals, feels nausea, at times. Has chronic constipation, and takes Linzes.  As patient endoscopy was done 4-5 years ago and colonoscopy was done 02/2024 of this past year.  They are here today for postop visit and to discuss results of pathology.

## 2024-07-22 NOTE — PLAN
[FreeTextEntry1] : discussed results of pathology and copy of report provided to the patient  plan for R hemicolectomy  clearance from PCP for medical optimization prep sent to pharmacy  RTO postop   Patient's questions and concerns addressed to their satisfaction, and patient verbalized an understanding of the information discussed.

## 2024-07-31 ENCOUNTER — NON-APPOINTMENT (OUTPATIENT)
Age: 79
End: 2024-07-31

## 2024-08-06 ENCOUNTER — APPOINTMENT (OUTPATIENT)
Dept: SURGERY | Facility: HOSPITAL | Age: 79
End: 2024-08-06

## 2024-08-07 ENCOUNTER — OUTPATIENT (OUTPATIENT)
Dept: OUTPATIENT SERVICES | Facility: HOSPITAL | Age: 79
LOS: 1 days | End: 2024-08-07
Payer: MEDICARE

## 2024-08-07 ENCOUNTER — TRANSCRIPTION ENCOUNTER (OUTPATIENT)
Age: 79
End: 2024-08-07

## 2024-08-07 VITALS
WEIGHT: 177.91 LBS | DIASTOLIC BLOOD PRESSURE: 72 MMHG | OXYGEN SATURATION: 98 % | RESPIRATION RATE: 25 BRPM | TEMPERATURE: 98 F | HEIGHT: 65 IN | SYSTOLIC BLOOD PRESSURE: 145 MMHG | HEART RATE: 84 BPM

## 2024-08-07 VITALS
RESPIRATION RATE: 20 BRPM | OXYGEN SATURATION: 99 % | HEART RATE: 72 BPM | DIASTOLIC BLOOD PRESSURE: 61 MMHG | SYSTOLIC BLOOD PRESSURE: 142 MMHG

## 2024-08-07 DIAGNOSIS — Z98.49 CATARACT EXTRACTION STATUS, UNSPECIFIED EYE: Chronic | ICD-10-CM

## 2024-08-07 DIAGNOSIS — K92.0 HEMATEMESIS: ICD-10-CM

## 2024-08-07 DIAGNOSIS — Z90.49 ACQUIRED ABSENCE OF OTHER SPECIFIED PARTS OF DIGESTIVE TRACT: Chronic | ICD-10-CM

## 2024-08-07 LAB — GLUCOSE BLDC GLUCOMTR-MCNC: 138 MG/DL — HIGH (ref 70–99)

## 2024-08-07 PROCEDURE — 88312 SPECIAL STAINS GROUP 1: CPT | Mod: 26

## 2024-08-07 PROCEDURE — 88305 TISSUE EXAM BY PATHOLOGIST: CPT | Mod: 26

## 2024-08-07 PROCEDURE — 82962 GLUCOSE BLOOD TEST: CPT

## 2024-08-07 PROCEDURE — 43239 EGD BIOPSY SINGLE/MULTIPLE: CPT

## 2024-08-07 PROCEDURE — 88312 SPECIAL STAINS GROUP 1: CPT

## 2024-08-07 PROCEDURE — 88305 TISSUE EXAM BY PATHOLOGIST: CPT

## 2024-08-07 RX ORDER — BACTERIOSTATIC SODIUM CHLORIDE 0.9 %
500 VIAL (ML) INJECTION
Refills: 0 | Status: COMPLETED | OUTPATIENT
Start: 2024-08-07 | End: 2024-08-07

## 2024-08-07 RX ORDER — DEXTROSE MONOHYDRATE, SODIUM CHLORIDE, SODIUM LACTATE, CALCIUM CHLORIDE, MAGNESIUM CHLORIDE 1.5; 538; 448; 18.4; 5.08 G/100ML; MG/100ML; MG/100ML; MG/100ML; MG/100ML
1000 SOLUTION INTRAPERITONEAL
Refills: 0 | Status: DISCONTINUED | OUTPATIENT
Start: 2024-08-07 | End: 2024-08-21

## 2024-08-07 RX ADMIN — Medication 30 MILLILITER(S): at 08:25

## 2024-08-07 NOTE — ASU PATIENT PROFILE, ADULT - NSICDXPASTMEDICALHX_GEN_ALL_CORE_FT
PAST MEDICAL HISTORY:  Adenoma     DM (diabetes mellitus)     Gastric reflux     Hepatitis C     HLD (hyperlipidemia)     HTN (hypertension)     Liver cirrhosis

## 2024-08-07 NOTE — ASU DISCHARGE PLAN (ADULT/PEDIATRIC) - NS MD DC FALL RISK RISK
For information on Fall & Injury Prevention, visit: [email or web address] OR  [email or web address] OR  https://www.cdc.gov/steadi/patient.html unknown

## 2024-08-12 LAB — SURGICAL PATHOLOGY STUDY: SIGNIFICANT CHANGE UP

## 2024-08-13 ENCOUNTER — APPOINTMENT (OUTPATIENT)
Dept: CT IMAGING | Facility: CLINIC | Age: 79
End: 2024-08-13
Payer: MEDICARE

## 2024-08-13 PROCEDURE — 71260 CT THORAX DX C+: CPT

## 2024-08-21 ENCOUNTER — NON-APPOINTMENT (OUTPATIENT)
Age: 79
End: 2024-08-21

## 2024-08-21 ENCOUNTER — APPOINTMENT (OUTPATIENT)
Dept: GASTROENTEROLOGY | Facility: CLINIC | Age: 79
End: 2024-08-21
Payer: MEDICARE

## 2024-08-21 VITALS — WEIGHT: 182 LBS | HEIGHT: 67 IN | BODY MASS INDEX: 28.56 KG/M2

## 2024-08-21 DIAGNOSIS — C16.9 MALIGNANT NEOPLASM OF STOMACH, UNSPECIFIED: ICD-10-CM

## 2024-08-21 PROCEDURE — 99213 OFFICE O/P EST LOW 20 MIN: CPT

## 2024-08-21 NOTE — REASON FOR VISIT
[Initial MDC] : an initial MDC visit for [[facility] Referral] : [facility] [FreeTextEntry2] : Appendix and Gastric cancer

## 2024-08-21 NOTE — REASON FOR VISIT
[Initial MDC] : an initial MDC visit for [[facility] Referral] : [facility] [FreeTextEntry2] : Appendix and Gastric cancer

## 2024-08-21 NOTE — HISTORY OF PRESENT ILLNESS
[Incidental finding] : incidental finding [Abdominal Pain] : abdominal pain [EGD] : EGD [Biopsy] : biopsy performed [After Surgery] : after surgery [Genetics] : Genetics [Restricted in physically strenuous activity but ambulatory and able to carry out work of a light or sedentary nature] : Status 1 - Restricted in physically strenuous activity but ambulatory and able to carry out work of a light or sedentary nature, e.g., light house work, office work [TextBox_4] : 8/15/14 [TextBox_6] : Gastric and appendiceal cancer  [TextBox_16] : nausea, vomiting bloody emesis, diarrhea, sweats and dizziness.  [TextBox_23] : 7 [TextBox_21] : 1.4 [TextBox_39] : 70- S-24-179458/ 70- S-24-671687  [TextBox_41] : -Invasive adenocarcinoma, moderately differentiated, appendix   -G2, moderately differentiated   -All margins negative for   invasive carcinoma   pT Category:   pT1   pN Category:   pN not assigned    MMR (-)    Gastric adenocarcinoma, diffuse signet ring type, with mucin present   -Intestinal metaplasia seen, with low grade dysplasia.  [TextBox_43] : 07/16/24/  08/16/24 [TextBox_5] : 08/21/24 [TextBox_74] : 78y/o post Lap appendectomy 7/3, after being admitted to the ED with nausea, vomiting with episode of bloody emesis associated with weakness, sweats, and dizziness. Pt has hx of anemia, post 4 cycles of IV iron with Dr. Charles. Pt also has a hx  of prostate cancer getting treatment with Xtandi and Lupron q3 months with Dr. Ceron. Pt reports feeling weak but able to ambulate independently. EGD with Dr. Chadwick 8/7/24.   CT A/P 7/2/24   Dilated fluid-filled appendiceal tip containing appendicoliths consistent with acute or chronic tip appendicitis. No surrounding inflammation or abscess.  Surgery 7/3/24 LAP Appendectomy with Dr. Ch   Path-   -Invasive adenocarcinoma, moderately differentiated, appendix  -G2, moderately differentiated  -All margins negative for invasive carcinoma  pT Category:   pT1  pN Category:   pN not assigned   MMR (-)    EGD 8/7/24 with Dr. Chadwick   Path- Gastric adenocarcinoma, diffuse signet ring type, with mucin present  -Intestinal metaplasia seen, with low grade dysplasia.  CT chest 8/13/24  Right upper lobe 2 mm nodule. Lingular subpleural 4 mm nodule versus lymph node unchanged from 7-2-24.   PMHx: HTN, DM, Hep C, prostate CA, chronic constipation.   [FreeTextEntry6] : Dr. Charles (med onc)  Dr. Zamora (Advanced GI)  Dr. Mederos (Surgical onc)  Dr. Mcmanus to order PET scan and MRI of the liver  Pending appointment with Dr. Zamora for EUS for staging.  Pending appointment with Dr. Mederos to discuss possible need for Dx. LAP to r/o peritoneal disease

## 2024-08-21 NOTE — ASSESSMENT
[FreeTextEntry1] : 79M with pmhx of appendiceal adenocarcinoma, recently diagnosed gastric adenocarcinoma, GONZALO, HTN, DM2, HCV, prostate ca, chronic constipation presenting for evaluation of gastric cancer. Denies any other complaints, no abd pain, n/v/d/c, melena, hematochezia, fever/chills, jaundice, dark urine, or other issues. Recently had EGD with Dr. Chadwick for GONZALO showing an antral ulcer, biopsies showed adenocarcinoma. Referred for EUS for localized staging.  - Schedule EUS.  - F/u with med onc and surg onc as planned.  Total time spent to complete patient's assessment, review medical chart including labs & personal review of prior imaging and available endoscopy records, counseling and discussion of plan of care was more than 30 minutes.

## 2024-08-21 NOTE — HISTORY OF PRESENT ILLNESS
[Incidental finding] : incidental finding [Abdominal Pain] : abdominal pain [EGD] : EGD [Biopsy] : biopsy performed [After Surgery] : after surgery [Genetics] : Genetics [Restricted in physically strenuous activity but ambulatory and able to carry out work of a light or sedentary nature] : Status 1 - Restricted in physically strenuous activity but ambulatory and able to carry out work of a light or sedentary nature, e.g., light house work, office work [TextBox_4] : 8/15/14 [TextBox_6] : Gastric and appendiceal cancer  [TextBox_16] : nausea, vomiting bloody emesis, diarrhea, sweats and dizziness.  [TextBox_21] : 1.4 [TextBox_23] : 7 [TextBox_39] : 70- S-24-690414/ 70- S-24-000500  [TextBox_41] : -Invasive adenocarcinoma, moderately differentiated, appendix   -G2, moderately differentiated   -All margins negative for   invasive carcinoma   pT Category:   pT1   pN Category:   pN not assigned    MMR (-)    Gastric adenocarcinoma, diffuse signet ring type, with mucin present   -Intestinal metaplasia seen, with low grade dysplasia.  [TextBox_43] : 07/16/24/  08/16/24 [TextBox_5] : 08/21/24 [TextBox_74] : 78y/o post Lap appendectomy 7/3, after being admitted to the ED with nausea, vomiting with episode of bloody emesis associated with weakness, sweats, and dizziness. Pt has hx of anemia, post 4 cycles of IV iron with Dr. Charles. Pt also has a hx  of prostate cancer getting treatment with Xtandi and Lupron q3 months with Dr. Ceron. Pt reports feeling weak but able to ambulate independently. EGD with Dr. Chadwick 8/7/24.   CT A/P 7/2/24   Dilated fluid-filled appendiceal tip containing appendicoliths consistent with acute or chronic tip appendicitis. No surrounding inflammation or abscess.  Surgery 7/3/24 LAP Appendectomy with Dr. Ch   Path-   -Invasive adenocarcinoma, moderately differentiated, appendix  -G2, moderately differentiated  -All margins negative for invasive carcinoma  pT Category:   pT1  pN Category:   pN not assigned   MMR (-)    EGD 8/7/24 with Dr. Chadwick   Path- Gastric adenocarcinoma, diffuse signet ring type, with mucin present  -Intestinal metaplasia seen, with low grade dysplasia.  CT chest 8/13/24  Right upper lobe 2 mm nodule. Lingular subpleural 4 mm nodule versus lymph node unchanged from 7-2-24.   PMHx: HTN, DM, Hep C, prostate CA, chronic constipation.   [FreeTextEntry6] : Dr. Charles (med onc)  Dr. Zamora (Advanced GI)  Dr. Mederos (Surgical onc)  Dr. Mcmanus to order PET scan and MRI of the liver  Pending appointment with Dr. Zamora for EUS for staging.  Pending appointment with Dr. Mederos to discuss possible need for Dx. LAP to r/o peritoneal disease

## 2024-08-21 NOTE — HISTORY OF PRESENT ILLNESS
[FreeTextEntry1] : 79M with pmhx of appendiceal adenocarcinoma, recently diagnosed gastric adenocarcinoma, GONZALO, HTN, DM2, HCV, prostate ca, chronic constipation presenting for evaluation of gastric cancer. Denies any other complaints, no abd pain, n/v/d/c, melena, hematochezia, fever/chills, jaundice, dark urine, or other issues. Recently had EGD with Dr. Chadwick for GONZALO showing an antral ulcer, biopsies showed adenocarcinoma. Referred for EUS for localized staging.

## 2024-08-21 NOTE — REASON FOR VISIT
[Home] : at home, [unfilled] , at the time of the visit. [Medical Office: ([facility])___] : at the medical office located in  [Patient] : the patient [Self] : self [This encounter was initiated by telehealth (audio with video) and converted to telephone (audio only) due to technical difficulties.] : This encounter was initiated by telehealth (audio with video) and converted to telephone (audio only) due to technical difficulties. [Initial Evaluation] : an initial evaluation

## 2024-08-22 ENCOUNTER — NON-APPOINTMENT (OUTPATIENT)
Age: 79
End: 2024-08-22

## 2024-08-22 NOTE — REASON FOR VISIT
[Initial Consultation] : an initial consultation for [FreeTextEntry2] : appendiceal and gastric cancer

## 2024-08-22 NOTE — HISTORY OF PRESENT ILLNESS
[de-identified] : 80y/o post Lap appendectomy 7/3, after being admitted to the ED with nausea, vomiting with episode of bloody emesis associated with weakness, sweats, and dizziness. Pt has hx of anemia, post 4 cycles of IV iron with Dr. Charles. Pt also has a hx of prostate cancer getting treatment with Xtandi and Lupron q3 months with Dr. Ceron. Pt reports feeling weak but able to ambulate independently. EGD with Dr. Chadwick 8/7/24.  CT A/P 7/2/24 Dilated fluid-filled appendiceal tip containing appendicoliths consistent with acute or chronic tip appendicitis. No surrounding inflammation or abscess.  Surgery 7/3/24 LAP Appendectomy with Dr. Ch Path- -Invasive adenocarcinoma, moderately differentiated, appendix -G2, moderately differentiated -All margins negative for invasive carcinoma pT Category: pT1 pN Category: pN not assigned MMR (-)  EGD 8/7/24 with Dr. Chadwick showed a single large cratered ulcer found in the antrum taking up the majority of the greater curvature of the antrum. Additional finding include friability irregular margins and hard consistency- area was biopsied.  Path- Gastric adenocarcinoma, diffuse signet ring type, with mucin present -Intestinal metaplasia seen, with low grade dysplasia.  CT chest 8/13/24 Right upper lobe 2 mm nodule. Lingular subpleural 4 mm nodule versus lymph node unchanged from 7-2-24.  Sched for PET and MRI on 9/5//24  PMHx: HTN, DM, Hep C, prostate CA, chronic constipation.   ECOG 1

## 2024-08-23 ENCOUNTER — APPOINTMENT (OUTPATIENT)
Dept: SURGICAL ONCOLOGY | Facility: CLINIC | Age: 79
End: 2024-08-23

## 2024-08-27 ENCOUNTER — APPOINTMENT (OUTPATIENT)
Dept: GASTROENTEROLOGY | Facility: HOSPITAL | Age: 79
End: 2024-08-27

## 2024-08-27 ENCOUNTER — RESULT REVIEW (OUTPATIENT)
Age: 79
End: 2024-08-27

## 2024-08-27 ENCOUNTER — TRANSCRIPTION ENCOUNTER (OUTPATIENT)
Age: 79
End: 2024-08-27

## 2024-08-27 ENCOUNTER — OUTPATIENT (OUTPATIENT)
Dept: OUTPATIENT SERVICES | Facility: HOSPITAL | Age: 79
LOS: 1 days | End: 2024-08-27
Payer: MEDICARE

## 2024-08-27 VITALS
HEART RATE: 78 BPM | DIASTOLIC BLOOD PRESSURE: 87 MMHG | SYSTOLIC BLOOD PRESSURE: 136 MMHG | OXYGEN SATURATION: 96 % | RESPIRATION RATE: 21 BRPM

## 2024-08-27 VITALS
OXYGEN SATURATION: 98 % | HEIGHT: 66.93 IN | SYSTOLIC BLOOD PRESSURE: 154 MMHG | RESPIRATION RATE: 15 BRPM | WEIGHT: 182.98 LBS | DIASTOLIC BLOOD PRESSURE: 76 MMHG | HEART RATE: 86 BPM | TEMPERATURE: 98 F

## 2024-08-27 DIAGNOSIS — C16.9 MALIGNANT NEOPLASM OF STOMACH, UNSPECIFIED: ICD-10-CM

## 2024-08-27 DIAGNOSIS — Z98.49 CATARACT EXTRACTION STATUS, UNSPECIFIED EYE: Chronic | ICD-10-CM

## 2024-08-27 DIAGNOSIS — Z90.49 ACQUIRED ABSENCE OF OTHER SPECIFIED PARTS OF DIGESTIVE TRACT: Chronic | ICD-10-CM

## 2024-08-27 LAB — GLUCOSE BLDC GLUCOMTR-MCNC: 124 MG/DL — HIGH (ref 70–99)

## 2024-08-27 PROCEDURE — 88305 TISSUE EXAM BY PATHOLOGIST: CPT | Mod: 26

## 2024-08-27 PROCEDURE — 88312 SPECIAL STAINS GROUP 1: CPT

## 2024-08-27 PROCEDURE — 88312 SPECIAL STAINS GROUP 1: CPT | Mod: 26

## 2024-08-27 PROCEDURE — 88305 TISSUE EXAM BY PATHOLOGIST: CPT

## 2024-08-27 PROCEDURE — 82962 GLUCOSE BLOOD TEST: CPT

## 2024-08-27 PROCEDURE — 43259 EGD US EXAM DUODENUM/JEJUNUM: CPT

## 2024-08-27 PROCEDURE — 43239 EGD BIOPSY SINGLE/MULTIPLE: CPT

## 2024-08-27 RX ORDER — SODIUM CHLORIDE 9 MG/ML
500 INJECTION INTRAMUSCULAR; INTRAVENOUS; SUBCUTANEOUS
Refills: 0 | Status: DISCONTINUED | OUTPATIENT
Start: 2024-08-27 | End: 2024-09-10

## 2024-08-29 LAB — SURGICAL PATHOLOGY STUDY: SIGNIFICANT CHANGE UP

## 2024-08-30 ENCOUNTER — NON-APPOINTMENT (OUTPATIENT)
Age: 79
End: 2024-08-30

## 2024-09-05 ENCOUNTER — APPOINTMENT (OUTPATIENT)
Dept: NUCLEAR MEDICINE | Facility: CLINIC | Age: 79
End: 2024-09-05

## 2024-09-05 ENCOUNTER — APPOINTMENT (OUTPATIENT)
Dept: MRI IMAGING | Facility: CLINIC | Age: 79
End: 2024-09-05

## 2024-09-10 ENCOUNTER — APPOINTMENT (OUTPATIENT)
Dept: HEMATOLOGY ONCOLOGY | Facility: CLINIC | Age: 79
End: 2024-09-10

## 2024-09-11 ENCOUNTER — APPOINTMENT (OUTPATIENT)
Dept: NUCLEAR MEDICINE | Facility: CLINIC | Age: 79
End: 2024-09-11

## 2024-09-11 PROCEDURE — A9552: CPT

## 2024-09-11 PROCEDURE — 78815 PET IMAGE W/CT SKULL-THIGH: CPT | Mod: PI

## 2024-09-13 ENCOUNTER — APPOINTMENT (OUTPATIENT)
Dept: SURGICAL ONCOLOGY | Facility: CLINIC | Age: 79
End: 2024-09-13

## 2024-09-18 ENCOUNTER — APPOINTMENT (OUTPATIENT)
Dept: MRI IMAGING | Facility: CLINIC | Age: 79
End: 2024-09-18
Payer: MEDICARE

## 2024-09-18 PROCEDURE — A9585: CPT

## 2024-09-18 PROCEDURE — 74183 MRI ABD W/O CNTR FLWD CNTR: CPT | Mod: 26

## 2025-02-09 ENCOUNTER — INPATIENT (INPATIENT)
Facility: HOSPITAL | Age: 80
LOS: 0 days | Discharge: HOME | DRG: 204 | End: 2025-02-10
Attending: INTERNAL MEDICINE | Admitting: INTERNAL MEDICINE
Payer: MEDICARE

## 2025-02-09 VITALS
TEMPERATURE: 98 F | DIASTOLIC BLOOD PRESSURE: 53 MMHG | SYSTOLIC BLOOD PRESSURE: 127 MMHG | HEART RATE: 86 BPM | OXYGEN SATURATION: 97 % | RESPIRATION RATE: 17 BRPM | WEIGHT: 175.05 LBS | HEIGHT: 64 IN

## 2025-02-09 DIAGNOSIS — E78.5 HYPERLIPIDEMIA, UNSPECIFIED: ICD-10-CM

## 2025-02-09 DIAGNOSIS — F41.9 ANXIETY DISORDER, UNSPECIFIED: ICD-10-CM

## 2025-02-09 DIAGNOSIS — N40.0 BENIGN PROSTATIC HYPERPLASIA WITHOUT LOWER URINARY TRACT SYMPTOMS: ICD-10-CM

## 2025-02-09 DIAGNOSIS — C16.9 MALIGNANT NEOPLASM OF STOMACH, UNSPECIFIED: ICD-10-CM

## 2025-02-09 DIAGNOSIS — D64.9 ANEMIA, UNSPECIFIED: ICD-10-CM

## 2025-02-09 DIAGNOSIS — Z98.49 CATARACT EXTRACTION STATUS, UNSPECIFIED EYE: Chronic | ICD-10-CM

## 2025-02-09 DIAGNOSIS — Z29.9 ENCOUNTER FOR PROPHYLACTIC MEASURES, UNSPECIFIED: ICD-10-CM

## 2025-02-09 DIAGNOSIS — R63.0 ANOREXIA: ICD-10-CM

## 2025-02-09 DIAGNOSIS — R06.02 SHORTNESS OF BREATH: ICD-10-CM

## 2025-02-09 DIAGNOSIS — R62.7 ADULT FAILURE TO THRIVE: ICD-10-CM

## 2025-02-09 DIAGNOSIS — E11.9 TYPE 2 DIABETES MELLITUS WITHOUT COMPLICATIONS: ICD-10-CM

## 2025-02-09 DIAGNOSIS — K21.9 GASTRO-ESOPHAGEAL REFLUX DISEASE WITHOUT ESOPHAGITIS: ICD-10-CM

## 2025-02-09 DIAGNOSIS — Z90.49 ACQUIRED ABSENCE OF OTHER SPECIFIED PARTS OF DIGESTIVE TRACT: Chronic | ICD-10-CM

## 2025-02-09 DIAGNOSIS — I10 ESSENTIAL (PRIMARY) HYPERTENSION: ICD-10-CM

## 2025-02-09 LAB
ALBUMIN SERPL ELPH-MCNC: 3.3 G/DL — LOW (ref 3.5–5)
ALP SERPL-CCNC: 34 U/L — LOW (ref 40–120)
ALT FLD-CCNC: 16 U/L DA — SIGNIFICANT CHANGE UP (ref 10–60)
ANION GAP SERPL CALC-SCNC: 7 MMOL/L — SIGNIFICANT CHANGE UP (ref 5–17)
ANISOCYTOSIS BLD QL: SLIGHT — SIGNIFICANT CHANGE UP
APPEARANCE UR: CLEAR — SIGNIFICANT CHANGE UP
APTT BLD: 26.6 SEC — SIGNIFICANT CHANGE UP (ref 24.5–35.6)
AST SERPL-CCNC: 7 U/L — LOW (ref 10–40)
BACTERIA # UR AUTO: ABNORMAL /HPF
BASE EXCESS BLDV CALC-SCNC: 3.6 MMOL/L — SIGNIFICANT CHANGE UP
BASOPHILS # BLD AUTO: 0.02 K/UL — SIGNIFICANT CHANGE UP (ref 0–0.2)
BASOPHILS NFR BLD AUTO: 0.3 % — SIGNIFICANT CHANGE UP (ref 0–2)
BILIRUB SERPL-MCNC: 0.2 MG/DL — SIGNIFICANT CHANGE UP (ref 0.2–1.2)
BILIRUB UR-MCNC: NEGATIVE — SIGNIFICANT CHANGE UP
BLD GP AB SCN SERPL QL: SIGNIFICANT CHANGE UP
BUN SERPL-MCNC: 16 MG/DL — SIGNIFICANT CHANGE UP (ref 7–18)
CA-I SERPL-SCNC: SIGNIFICANT CHANGE UP MMOL/L (ref 1.15–1.33)
CALCIUM SERPL-MCNC: 8.8 MG/DL — SIGNIFICANT CHANGE UP (ref 8.4–10.5)
CHLORIDE SERPL-SCNC: 105 MMOL/L — SIGNIFICANT CHANGE UP (ref 96–108)
CO2 SERPL-SCNC: 25 MMOL/L — SIGNIFICANT CHANGE UP (ref 22–31)
COLOR SPEC: YELLOW — SIGNIFICANT CHANGE UP
CREAT SERPL-MCNC: 0.78 MG/DL — SIGNIFICANT CHANGE UP (ref 0.5–1.3)
DIFF PNL FLD: NEGATIVE — SIGNIFICANT CHANGE UP
EGFR: 90 ML/MIN/1.73M2 — SIGNIFICANT CHANGE UP
EOSINOPHIL # BLD AUTO: 0 K/UL — SIGNIFICANT CHANGE UP (ref 0–0.5)
EOSINOPHIL NFR BLD AUTO: 0 % — SIGNIFICANT CHANGE UP (ref 0–6)
EPI CELLS # UR: PRESENT
FLUAV AG NPH QL: SIGNIFICANT CHANGE UP
FLUBV AG NPH QL: SIGNIFICANT CHANGE UP
GAS PNL BLDV: 132 MMOL/L — LOW (ref 136–145)
GAS PNL BLDV: SIGNIFICANT CHANGE UP
GLUCOSE BLDC GLUCOMTR-MCNC: 138 MG/DL — HIGH (ref 70–99)
GLUCOSE SERPL-MCNC: 123 MG/DL — HIGH (ref 70–99)
GLUCOSE UR QL: NEGATIVE MG/DL — SIGNIFICANT CHANGE UP
HCO3 BLDV-SCNC: 26 MMOL/L — SIGNIFICANT CHANGE UP (ref 22–29)
HCT VFR BLD CALC: 16.5 % — CRITICAL LOW (ref 39–50)
HGB BLD-MCNC: 5.1 G/DL — CRITICAL LOW (ref 13–17)
HYPOCHROMIA BLD QL: SLIGHT — SIGNIFICANT CHANGE UP
IMM GRANULOCYTES NFR BLD AUTO: 0.5 % — SIGNIFICANT CHANGE UP (ref 0–0.9)
INR BLD: 0.96 RATIO — SIGNIFICANT CHANGE UP (ref 0.85–1.16)
KETONES UR-MCNC: NEGATIVE MG/DL — SIGNIFICANT CHANGE UP
LACTATE BLDV-MCNC: 2.1 MMOL/L — HIGH (ref 0.5–2)
LEUKOCYTE ESTERASE UR-ACNC: NEGATIVE — SIGNIFICANT CHANGE UP
LIDOCAIN IGE QN: 55 U/L — SIGNIFICANT CHANGE UP (ref 13–75)
LYMPHOCYTES # BLD AUTO: 1.24 K/UL — SIGNIFICANT CHANGE UP (ref 1–3.3)
LYMPHOCYTES # BLD AUTO: 21.5 % — SIGNIFICANT CHANGE UP (ref 13–44)
MAGNESIUM SERPL-MCNC: 1.9 MG/DL — SIGNIFICANT CHANGE UP (ref 1.6–2.6)
MANUAL SMEAR VERIFICATION: SIGNIFICANT CHANGE UP
MCHC RBC-ENTMCNC: 23 PG — LOW (ref 27–34)
MCHC RBC-ENTMCNC: 30.9 G/DL — LOW (ref 32–36)
MCV RBC AUTO: 74.3 FL — LOW (ref 80–100)
MICROCYTES BLD QL: SLIGHT — SIGNIFICANT CHANGE UP
MONOCYTES # BLD AUTO: 0.61 K/UL — SIGNIFICANT CHANGE UP (ref 0–0.9)
MONOCYTES NFR BLD AUTO: 10.6 % — SIGNIFICANT CHANGE UP (ref 2–14)
NEUTROPHILS # BLD AUTO: 3.88 K/UL — SIGNIFICANT CHANGE UP (ref 1.8–7.4)
NEUTROPHILS NFR BLD AUTO: 67.1 % — SIGNIFICANT CHANGE UP (ref 43–77)
NITRITE UR-MCNC: NEGATIVE — SIGNIFICANT CHANGE UP
NRBC # BLD: 0 /100 WBCS — SIGNIFICANT CHANGE UP (ref 0–0)
NRBC BLD-RTO: 0 /100 WBCS — SIGNIFICANT CHANGE UP (ref 0–0)
NT-PROBNP SERPL-SCNC: 215 PG/ML — SIGNIFICANT CHANGE UP (ref 0–450)
OVALOCYTES BLD QL SMEAR: SLIGHT — SIGNIFICANT CHANGE UP
PCO2 BLDV: 33 MMHG — LOW (ref 42–55)
PH BLDV: 7.51 — HIGH (ref 7.32–7.43)
PH UR: 7.5 — SIGNIFICANT CHANGE UP (ref 5–8)
PHOSPHATE SERPL-MCNC: 2.4 MG/DL — LOW (ref 2.5–4.5)
PLAT MORPH BLD: NORMAL — SIGNIFICANT CHANGE UP
PLATELET # BLD AUTO: 257 K/UL — SIGNIFICANT CHANGE UP (ref 150–400)
PLATELET COUNT - ESTIMATE: NORMAL — SIGNIFICANT CHANGE UP
PO2 BLDV: 37 MMHG — SIGNIFICANT CHANGE UP
POIKILOCYTOSIS BLD QL AUTO: SLIGHT — SIGNIFICANT CHANGE UP
POTASSIUM BLDV-SCNC: 4.1 MMOL/L — SIGNIFICANT CHANGE UP (ref 3.5–5.1)
POTASSIUM SERPL-MCNC: 4 MMOL/L — SIGNIFICANT CHANGE UP (ref 3.5–5.3)
POTASSIUM SERPL-SCNC: 4 MMOL/L — SIGNIFICANT CHANGE UP (ref 3.5–5.3)
PROT SERPL-MCNC: 6 G/DL — SIGNIFICANT CHANGE UP (ref 6–8.3)
PROT UR-MCNC: NEGATIVE MG/DL — SIGNIFICANT CHANGE UP
PROTHROM AB SERPL-ACNC: 11.1 SEC — SIGNIFICANT CHANGE UP (ref 9.9–13.4)
RBC # BLD: 2.22 M/UL — LOW (ref 4.2–5.8)
RBC # FLD: 18.2 % — HIGH (ref 10.3–14.5)
RBC BLD AUTO: ABNORMAL
RBC CASTS # UR COMP ASSIST: 0 /HPF — SIGNIFICANT CHANGE UP (ref 0–4)
RSV RNA NPH QL NAA+NON-PROBE: SIGNIFICANT CHANGE UP
SAO2 % BLDV: 72.3 % — SIGNIFICANT CHANGE UP
SARS-COV-2 RNA SPEC QL NAA+PROBE: SIGNIFICANT CHANGE UP
SODIUM SERPL-SCNC: 137 MMOL/L — SIGNIFICANT CHANGE UP (ref 135–145)
SP GR SPEC: 1.02 — SIGNIFICANT CHANGE UP (ref 1–1.03)
TROPONIN I, HIGH SENSITIVITY RESULT: 33.5 NG/L — SIGNIFICANT CHANGE UP
TROPONIN I, HIGH SENSITIVITY RESULT: 37.6 NG/L — SIGNIFICANT CHANGE UP
UROBILINOGEN FLD QL: 0.2 MG/DL — SIGNIFICANT CHANGE UP (ref 0.2–1)
WBC # BLD: 5.78 K/UL — SIGNIFICANT CHANGE UP (ref 3.8–10.5)
WBC # FLD AUTO: 5.78 K/UL — SIGNIFICANT CHANGE UP (ref 3.8–10.5)
WBC UR QL: 0 /HPF — SIGNIFICANT CHANGE UP (ref 0–5)

## 2025-02-09 PROCEDURE — 74177 CT ABD & PELVIS W/CONTRAST: CPT | Mod: 26

## 2025-02-09 PROCEDURE — 99291 CRITICAL CARE FIRST HOUR: CPT

## 2025-02-09 PROCEDURE — 71045 X-RAY EXAM CHEST 1 VIEW: CPT | Mod: 26

## 2025-02-09 PROCEDURE — 71275 CT ANGIOGRAPHY CHEST: CPT | Mod: 26

## 2025-02-09 PROCEDURE — 93010 ELECTROCARDIOGRAM REPORT: CPT

## 2025-02-09 RX ORDER — PALIPERIDONE 6 MG/1
6 TABLET, EXTENDED RELEASE ORAL AT BEDTIME
Refills: 0 | Status: DISCONTINUED | OUTPATIENT
Start: 2025-02-09 | End: 2025-02-09

## 2025-02-09 RX ORDER — SODIUM CHLORIDE 9 G/ML
1000 INJECTION, SOLUTION INTRAVENOUS
Refills: 0 | Status: DISCONTINUED | OUTPATIENT
Start: 2025-02-09 | End: 2025-02-10

## 2025-02-09 RX ORDER — AMLODIPINE BESYLATE 5 MG
5 TABLET ORAL DAILY
Refills: 0 | Status: DISCONTINUED | OUTPATIENT
Start: 2025-02-09 | End: 2025-02-10

## 2025-02-09 RX ORDER — MAGNESIUM, ALUMINUM HYDROXIDE 200-225/5
30 SUSPENSION, ORAL (FINAL DOSE FORM) ORAL EVERY 4 HOURS
Refills: 0 | Status: DISCONTINUED | OUTPATIENT
Start: 2025-02-09 | End: 2025-02-10

## 2025-02-09 RX ORDER — PANTOPRAZOLE 20 MG/1
40 TABLET, DELAYED RELEASE ORAL
Refills: 0 | Status: DISCONTINUED | OUTPATIENT
Start: 2025-02-09 | End: 2025-02-10

## 2025-02-09 RX ORDER — TENAPANOR HYDROCHLORIDE 53.2 MG/1
1 TABLET ORAL
Refills: 0 | DISCHARGE

## 2025-02-09 RX ORDER — BISACODYL 5 MG
5 TABLET, DELAYED RELEASE (ENTERIC COATED) ORAL AT BEDTIME
Refills: 0 | Status: DISCONTINUED | OUTPATIENT
Start: 2025-02-09 | End: 2025-02-10

## 2025-02-09 RX ORDER — INSULIN LISPRO 100/ML
VIAL (ML) SUBCUTANEOUS AT BEDTIME
Refills: 0 | Status: DISCONTINUED | OUTPATIENT
Start: 2025-02-09 | End: 2025-02-10

## 2025-02-09 RX ORDER — TAMSULOSIN HYDROCHLORIDE 0.4 MG/1
0.4 CAPSULE ORAL AT BEDTIME
Refills: 0 | Status: DISCONTINUED | OUTPATIENT
Start: 2025-02-09 | End: 2025-02-10

## 2025-02-09 RX ORDER — ENOXAPARIN SODIUM 100 MG/ML
40 INJECTION SUBCUTANEOUS EVERY 24 HOURS
Refills: 0 | Status: DISCONTINUED | OUTPATIENT
Start: 2025-02-09 | End: 2025-02-09

## 2025-02-09 RX ORDER — BISACODYL 5 MG
1 TABLET, DELAYED RELEASE (ENTERIC COATED) ORAL
Refills: 0 | DISCHARGE

## 2025-02-09 RX ORDER — INSULIN LISPRO 100/ML
VIAL (ML) SUBCUTANEOUS
Refills: 0 | Status: DISCONTINUED | OUTPATIENT
Start: 2025-02-09 | End: 2025-02-10

## 2025-02-09 RX ORDER — OLANZAPINE 10 MG/1
1 TABLET, FILM COATED ORAL
Refills: 0 | DISCHARGE

## 2025-02-09 RX ORDER — DRONABINOL 5 MG/1
2.5 CAPSULE ORAL
Refills: 0 | Status: DISCONTINUED | OUTPATIENT
Start: 2025-02-09 | End: 2025-02-10

## 2025-02-09 RX ORDER — BACTERIOSTATIC SODIUM CHLORIDE 0.9 %
500 VIAL (ML) INJECTION ONCE
Refills: 0 | Status: COMPLETED | OUTPATIENT
Start: 2025-02-09 | End: 2025-02-09

## 2025-02-09 RX ORDER — ONDANSETRON 4 MG/1
4 TABLET, ORALLY DISINTEGRATING ORAL EVERY 8 HOURS
Refills: 0 | Status: DISCONTINUED | OUTPATIENT
Start: 2025-02-09 | End: 2025-02-10

## 2025-02-09 RX ORDER — PALIPERIDONE 6 MG/1
1 TABLET, EXTENDED RELEASE ORAL
Refills: 0 | DISCHARGE

## 2025-02-09 RX ORDER — PALIPERIDONE 6 MG/1
6 TABLET, EXTENDED RELEASE ORAL AT BEDTIME
Refills: 0 | Status: DISCONTINUED | OUTPATIENT
Start: 2025-02-09 | End: 2025-02-10

## 2025-02-09 RX ORDER — ACETAMINOPHEN, DIPHENHYDRAMINE HCL, PHENYLEPHRINE HCL 325; 25; 5 MG/1; MG/1; MG/1
3 TABLET ORAL AT BEDTIME
Refills: 0 | Status: DISCONTINUED | OUTPATIENT
Start: 2025-02-09 | End: 2025-02-10

## 2025-02-09 RX ORDER — ROSUVASTATIN CALCIUM 10 MG/1
10 TABLET, FILM COATED ORAL AT BEDTIME
Refills: 0 | Status: DISCONTINUED | OUTPATIENT
Start: 2025-02-09 | End: 2025-02-10

## 2025-02-09 RX ORDER — OLANZAPINE 10 MG/1
5 TABLET, FILM COATED ORAL DAILY
Refills: 0 | Status: DISCONTINUED | OUTPATIENT
Start: 2025-02-09 | End: 2025-02-10

## 2025-02-09 RX ORDER — ACETAMINOPHEN 160 MG/5ML
1000 SUSPENSION ORAL ONCE
Refills: 0 | Status: COMPLETED | OUTPATIENT
Start: 2025-02-09 | End: 2025-02-09

## 2025-02-09 RX ADMIN — Medication 500 MILLILITER(S): at 13:49

## 2025-02-09 RX ADMIN — ACETAMINOPHEN, DIPHENHYDRAMINE HCL, PHENYLEPHRINE HCL 3 MILLIGRAM(S): 325; 25; 5 TABLET ORAL at 22:47

## 2025-02-09 RX ADMIN — ACETAMINOPHEN 400 MILLIGRAM(S): 160 SUSPENSION ORAL at 13:48

## 2025-02-09 RX ADMIN — SODIUM CHLORIDE 75 MILLILITER(S): 9 INJECTION, SOLUTION INTRAVENOUS at 22:48

## 2025-02-09 RX ADMIN — PALIPERIDONE 6 MILLIGRAM(S): 6 TABLET, EXTENDED RELEASE ORAL at 23:01

## 2025-02-09 RX ADMIN — TAMSULOSIN HYDROCHLORIDE 0.4 MILLIGRAM(S): 0.4 CAPSULE ORAL at 22:47

## 2025-02-09 RX ADMIN — Medication 5 MILLIGRAM(S): at 22:47

## 2025-02-09 RX ADMIN — ROSUVASTATIN CALCIUM 10 MILLIGRAM(S): 10 TABLET, FILM COATED ORAL at 22:38

## 2025-02-09 NOTE — PATIENT PROFILE ADULT - FALL HARM RISK - HARM RISK INTERVENTIONS
Assistance with ambulation/Assistance OOB with selected safe patient handling equipment/Communicate Risk of Fall with Harm to all staff/Monitor for mental status changes/Monitor gait and stability/Provide patient with walking aids - walker, cane, crutches/Reinforce activity limits and safety measures with patient and family/Reorient to person, place and time as needed/Review medications for side effects contributing to fall risk/Sit up slowly, dangle for a short time, stand at bedside before walking/Tailored Fall Risk Interventions/Toileting schedule using arm’s reach rule for commode and bathroom/Use of alarms - bed, chair and/or voice tab/Visual Cue: Yellow wristband and red socks/Bed in lowest position, wheels locked, appropriate side rails in place/Call bell, personal items and telephone in reach/Instruct patient to call for assistance before getting out of bed or chair/Non-slip footwear when patient is out of bed/[place] to call system/Physically safe environment - no spills, clutter or unnecessary equipment/Purposeful Proactive Rounding/Room/bathroom lighting operational, light cord in reach

## 2025-02-09 NOTE — ED PROVIDER NOTE - CLINICAL SUMMARY MEDICAL DECISION MAKING FREE TEXT BOX
80-year-old male Cook Islander-speaking accompanied by his wife  ID [id number] has past medical history of gastric adenocarcinoma, colon cancer, prostate cancer–opted out of recommended treatments as per the wife coming in with decreased appetite for past few days, shortness of breath of past 2 days, nausea, vomiting, chest pain since today.  Patient has an ongoing abdominal pain for months.  Patient has healthcare proxy that shows patient wants to be DNR, DNI does not want any artificial nutrition/hydration/antibiotics.  However today patient does not want to do the full workup and gets hydration if necessary.  States those wishes are for when he is in terminal state.    Patient is nontoxic-appearing.  No distress.  Satting well on room air.  Clear to auscultation bilaterally.  No pedal edema.  Equal strength and sensation in all extremities.  Abdomen is soft with tenderness to palpation in right lower quadrant.  No CVA tenderness to palpation.    Differential diagnoses include but not limited to electrolyte abnormalities, anemia, PE, worsening of cancer mass, obstruction, ACS.

## 2025-02-09 NOTE — H&P ADULT - ATTENDING COMMENTS
80-year-old Italian-speaking man from home, walks with assistance/needs walker/wheelchair, with hypertension, IBS, hyperlipidemia, anxiety/depression, GERD, diabetes, BPH, hx prostate cancer, and metastatic gastric adenocarcinoma diagnosed in August 2024 coming for a week of nausea, generalized weakness, decreased p.o. intake, and palpitations. He has lost about 16 pounds in 2 months. He was most recently seen by [facility] in October 2024 and decided to opt out of treatment for adenocarcinoma due to advanced age. On examination he is AO4 but weak and pale. He denies any hematemesis, melena, hematuria, or nosebleeds. Hemoglobin was noted to be 5.1.  Per paperwork family brought in earlier, he is DNI/DNR and does not want artificial nutrition/hydration/antibiotics once he has reached a terminal state.  Today, however, they would like all medical intervention except for intubation and compressions.    ED Course    Vitals: all wnl   Labs: lactate 2.1; pH venous 7.51 pco2 33; hb 5.1  Intervention: 2uprbc; 500cc IVF  Consults: Palliative; heme/onc   Images: CTA chest/AP: A few new subcentimeter lung nodules due to airway disease or metastasis. Gastric wall thickening. A new subcentimeter perigastric lymph nodes.         assessment  --  severe anemia likely 2nd to metastatic disease, h/o hypertension, IBS, hyperlipidemia, anxiety/depression, GERD, diabetes, BPH, prostate cancer, and metastatic gastric adenocarcinoma    plan  --  admit to med, transfuse 2units prbc, lispro ss, cont preadmit home meds, gi and dvt prophylaxis  cbc, bmp, mg, phos, lipids, tsh, hgba1c       palliative cons

## 2025-02-09 NOTE — H&P ADULT - PROBLEM SELECTOR PLAN 3
pH 7.51, pCO2 33 on admission   likely ISO blood loss   s/p 2uprbc in ER     f/u repeat cbc   transfuse prn   confirm GOC AO4 on admission  metastatic gastric adenocarcinoma, family has opted for no treatment   family wants DNR/DNI, comfort measures but "only if terminal"     confirm GOC   start dronabinol   Physical therapy consult needed  Palliative consult needed

## 2025-02-09 NOTE — H&P ADULT - PROBLEM SELECTOR PLAN 2
metastatic gastric adenocarcinoma, family has opted for no treatment   family wants DNR/DNI, comfort measures but "only if terminal"     confirm GOC   start dronabinol   pureed diet   Palliative consult needed Reports 5 days of palpitation  Likely in the setting of decreased hemoglobin  Received 2 units PRBC in the ER  Transfuse as needed  Monitor CBC

## 2025-02-09 NOTE — ED PROVIDER NOTE - PROGRESS NOTE DETAILS
Fredo Skaggs DO Blood consent obtained with  ID [id number] - risks and benefits are discussed. Case is discussed with Dr. Pace -unattached physician at this time.  Also discussed with MAR.  Agrees with admission.

## 2025-02-09 NOTE — H&P ADULT - NSHPREVIEWOFSYSTEMS_GEN_ALL_CORE
CONSTITUTIONAL:  No fevers or chills, good appetite, good general state  EYES/ENT:  No visual changes;  No vertigo or throat pain   NECK:  No neck pain or stiffness  RESPIRATORY:  No cough, wheezing, hemoptysis; No shortness of breath  CARDIOVASCULAR:  No chest pain or palpitations  GASTROINTESTINAL:  No abdominal pain. No nausea, vomiting, or hematemesis; No diarrhea or constipation. No melena or hematochezia.  GENITOURINARY:  No dysuria, frequency or hematuria  NEUROLOGICAL:  No HA, no numbness or LE weakness  MSK: no back pain, no joint pain  SKIN:  No itching, no skin rash CONSTITUTIONAL:  No fevers or chills, +poor appetite +gen weakness   EYES/ENT:  No visual changes;  No vertigo or throat pain   NECK:  No neck pain or stiffness  RESPIRATORY:  No cough, wheezing, hemoptysis; No shortness of breath  CARDIOVASCULAR:  No chest pain or palpitations  GASTROINTESTINAL:  +decreased PO intake +nausea  GENITOURINARY:  No dysuria, frequency or hematuria  NEUROLOGICAL:  No HA, no numbness or LE weakness  MSK: no back pain, no joint pain  SKIN:  No itching, no skin rash

## 2025-02-09 NOTE — H&P ADULT - HISTORY OF PRESENT ILLNESS
80-year-old male [place] Islander-speaking accompanied by his wife  ID [id number] has past medical history of gastric adenocarcinoma, colon cancer, prostate cancer–opted out of recommended treatments as per the wife coming in with decreased appetite for past few days, shortness of breath of past 2 days, nausea, vomiting, chest pain since today.  Patient has an ongoing abdominal pain for months.  Patient has healthcare proxy that shows patient wants to be DNR, DNI does not want any artificial nutrition/hydration/antibiotics.  However today patient does not want to do the full workup and gets hydration if necessary.  States those wishes are for when he is in terminal state.    ED Course    Vitals: all wnl   Labs: lactate 2.1; pH venous 7.51 pco2 33; hb 5.1  Intervention: 2uprbc; 500cc IVF  Consults: Palliative; heme/onc   Images: CTA chest/AP    A few new subcentimeter lung nodules due to airway disease or metastasis. Gastric wall thickening. A new subcentimeter perigastric lymph nodes.  80-year-old Swiss-speaking man from home, walks with assistance/needs walker/wheelchair, with hypertension, IBS, hyperlipidemia, anxiety/depression, GERD, diabetes, BPH, hx prostate cancer, and metastatic gastric adenocarcinoma diagnosed in August 2024 coming for a week of nausea, generalized weakness, decreased p.o. intake, and palpitations. He has lost about 16 pounds in 2 months. He was most recently seen by [facility] and October 2020 for decided to opt out of treatment for adenocarcinoma due to advanced age. On examination he is AO x 4 but weak and pale. He denies any hematemesis, melena, hematuria, or nosebleeds. hemoglobin was noted to be 5.1.  Per paperwork family brought in earlier, he is DNI/DNR and does not want artificial nutrition/hydration/antibiotics once he has reached a terminal state.  Today, however, they would like all medical intervention except for intubation and compressions.    ED Course    Vitals: all wnl   Labs: lactate 2.1; pH venous 7.51 pco2 33; hb 5.1  Intervention: 2uprbc; 500cc IVF  Consults: Palliative; heme/onc   Images: CTA chest/AP: A few new subcentimeter lung nodules due to airway disease or metastasis. Gastric wall thickening. A new subcentimeter perigastric lymph nodes.  80-year-old Belgian-speaking man from home, walks with assistance/needs walker/wheelchair, with hypertension, IBS, hyperlipidemia, anxiety/depression, GERD, diabetes, BPH, hx prostate cancer, and metastatic gastric adenocarcinoma diagnosed in August 2024 coming for a week of nausea, generalized weakness, decreased p.o. intake, and palpitations. He has lost about 16 pounds in 2 months. He was most recently seen by [facility] in October 2024 and decided to opt out of treatment for adenocarcinoma due to advanced age. On examination he is AO4 but weak and pale. He denies any hematemesis, melena, hematuria, or nosebleeds. Hemoglobin was noted to be 5.1.  Per paperwork family brought in earlier, he is DNI/DNR and does not want artificial nutrition/hydration/antibiotics once he has reached a terminal state.  Today, however, they would like all medical intervention except for intubation and compressions.    ED Course    Vitals: all wnl   Labs: lactate 2.1; pH venous 7.51 pco2 33; hb 5.1  Intervention: 2uprbc; 500cc IVF  Consults: Palliative; heme/onc   Images: CTA chest/AP: A few new subcentimeter lung nodules due to airway disease or metastasis. Gastric wall thickening. A new subcentimeter perigastric lymph nodes.

## 2025-02-09 NOTE — ED PROVIDER NOTE - CRITICAL CARE ATTENDING CONTRIBUTION TO CARE
The patient's condition is critical. Management options were put in place to ensure further deterioration does not occur. In addition to the usual care provided, I have spent additional time with this patient through but not limited to the following: additional documentation  reviewing test results,  discussing with consultants,  discussing with patient / patient's family prognosis and course of care,  reassessment of patient's status and response to interventions.    Had extensive conversation with patient and the family member to review the history. Plan of care is discussed. Pt required two blood transfusions. Risks and benefits of blood transfusion are discussed.

## 2025-02-09 NOTE — H&P ADULT - PROBLEM/PLAN-8
Jeane Escalante  : 1978  MRN: [id number]  CSN: [id number]    History and Physical    Subjective   Jeane Escalante is a 44 y.o. 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     Past Medical History:   Diagnosis Date   • Anxiety     h/o   • Depression     h/o   • Menorrhagia with irregular cycle      Past Surgical History:   Procedure Laterality Date   • D & C HYSTEROSCOPY  2020    with mole removal from inner left thigh.     OB History    Para Term  AB Living   4 4 4 0 0 4   SAB IAB Ectopic Molar Multiple Live Births   0 0 0 0 0 4      # Outcome Date GA Lbr Cole/2nd Weight Sex Delivery Anes PTL Lv   4 Term 03   2722 g (6 lb) M Vag-Spont EPI N CASS      Name: Pablo   3 Term 02   2722 g (6 lb) F Vag-Spont EPI  CASS      Name: Stacie   2 Term 98 40w3d  3629 g (8 lb) M Vag-Spont EPI  CASS      Name: Dany   1 Term 10/03/96 42w0d  3742 g (8 lb 4 oz) M Vag-Spont EPI N CASS      Name: Lane     Social History    Tobacco Use      Smoking status: Every Day        Packs/day: 1.00        Years: 20.00        Pack years: 20        Types: Cigarettes      Smokeless tobacco: Never      Current Outpatient Medications:   •  amoxicillin (AMOXIL) 500 MG capsule, Take 500 mg by mouth Every 8 (Eight) Hours., Disp: , Rfl:   •  cetirizine (zyrTEC) 10 MG tablet, Take 1 tablet by mouth Daily., Disp: 30 tablet, Rfl: 5  •  cyclobenzaprine (FLEXERIL) 10 MG tablet, Take 1 tablet by mouth 3 (Three) Times a Day As Needed for Muscle Spasms., Disp: 12 tablet, Rfl: 0  •  ferrous sulfate 325 (65 FE) MG tablet, Take 325 mg by mouth Daily With Breakfast., Disp: , Rfl:   •  ibuprofen (ADVIL,MOTRIN) 800 MG tablet, Take  by mouth See Admin Instructions. Take 1 tablet by mouth every 6-8 hours as needed for pain, Disp: , Rfl:   •  ibuprofen (ADVIL,MOTRIN) 800 MG tablet, Take 1 tablet by mouth Every 8 (Eight) Hours As Needed for Mild Pain ., Disp: 90 tablet,  Rfl: 0  •  Levonorgestrel (Mirena, 52 MG,) 20 MCG/DAY intrauterine device IUD, 1 each by Intrauterine route., Disp: , Rfl:   •  lidocaine (LIDODERM) 5 %, Place 1 patch on the skin as directed by provider Daily. Remove & Discard patch within 12 hours or as directed by MD, Disp: 30 patch, Rfl: 0  •  naproxen sodium (ALEVE) 220 MG tablet, Take 220 mg by mouth 2 (Two) Times a Day As Needed., Disp: , Rfl:     Allergies   Allergen Reactions   • Adhesive Tape Hives   • Sudafed [Pseudoephedrine] Rash   • Sulfa Antibiotics Rash   • Zithromax [Azithromycin] Dizziness       Review of Systems   All other systems reviewed and are negative.        Objective     Vital Signs: See nursing documentation   General: well developed; well nourished  no acute distress   Mental status: Alert and oriented   Heart: Not performed.   Lungs: breathing is unlabored   Abdomen: soft, non-tender; no masses  no umbilical or inguinal hernias are present   Pelvis: Not performed.        Assessment   1. Persistent CIN1 on colpo biopsy at 3:00, 9:00, and ECC for 2 years      Plan   1. To OR for LEEP        Esthela Jay MD       (Pt's PCP is Luz Reyes APRN)    DISPLAY PLAN FREE TEXT

## 2025-02-09 NOTE — ED ADULT NURSE NOTE - OBJECTIVE STATEMENT
Patient is c/o mid chest pain x yesterday. Patient denies SOB, denies any falls or injuries, No N/V/D, No blood in stool, no blood in urine. Fall bundle activated and delegated to PCA Judi. Patient and wife bedside educated on fall prevention and possible risks of injuries. Patient and wife verbalized understanding.

## 2025-02-09 NOTE — H&P ADULT - PROBLEM SELECTOR PLAN 4
diagnosed in 8/2024, following QMA   Gastric adenocarcinoma, diffuse signet ring type, with mucin present    confirm GOC  Palliative consult needed   Heme/onc consult needed pH 7.51, pCO2 33 on admission   likely ISO blood loss   s/p 2uprbc in ER     f/u repeat cbc   transfuse prn   confirm GOC

## 2025-02-09 NOTE — H&P ADULT - PROBLEM SELECTOR PLAN 5
SCD ISO anemia requiring transfusion diagnosed in 8/2024, following QMA   Gastric adenocarcinoma, diffuse signet ring type, with mucin present    confirm GOC  Palliative consult needed   Heme/onc consult needed

## 2025-02-09 NOTE — H&P ADULT - PROBLEM SELECTOR PLAN 1
hb 5.12 on admission   received 2L prbc in ER   likely ISO chronic disease and poor nutritional status     transfuse prn   confirm GOC hb 5.12 on admission   received 2L prbc in ER   likely ISO chronic disease and poor nutritional status     transfuse prn   Monitor CBC  Hold AC  confirm GOC

## 2025-02-09 NOTE — H&P ADULT - NSHPPHYSICALEXAM_GEN_ALL_CORE
Gen: AOx3, NAD, non-toxic, pleasant  HEAD:  Atraumatic, Normocephalic  EYES: PERRLA, conjunctiva and sclera clear  ENT: Moist mucous membranes  NECK: Supple, No JVD  CV: S1+S2 normal, no murmurs   Resp: Clear bilat, no resp distress, no crackles/wheezes  Abd: Soft, nontender, +BS  Ext: No LE edema, no cyanosis, LE pulses present  IV/Skin: No skin rash  Msk: No joint swelling  Neuro: AAOx3. No focal deficits  Psych: no anxiety, no delusional ideas, no suicidal ideation Gen: AOx3, NAD, non-toxic, pleasant; pale, weak   HEAD:  Atraumatic, Normocephalic  EYES: PERRLA, conjunctiva and sclera clear  ENT: Moist mucous membranes  NECK: Supple, No JVD  CV: S1+S2 normal, no murmurs   Resp: Clear bilat, no resp distress, no crackles/wheezes  Abd: Soft, nontender, +BS  Ext: No LE edema, no cyanosis, LE pulses present  IV/Skin: No skin rash  Msk: No joint swelling  Neuro: AAOx3. No focal deficits  Psych: no anxiety, no delusional ideas, no suicidal ideation

## 2025-02-09 NOTE — H&P ADULT - ASSESSMENT
80-year-old male [place]an-speaking accompanied by his wife  ID [id number] has past medical history of gastric adenocarcinoma, colon cancer, prostate cancer–opted out of recommended treatments as per the wife coming in with decreased appetite for past few days, shortness of breath of past 2 days, nausea, vomiting, chest pain since today.  Patient has an ongoing abdominal pain for months.  Patient has healthcare proxy that shows patient wants to be DNR, DNI does not want any artificial nutrition/hydration/antibiotics.  However today patient does not want to do the full workup and gets hydration if necessary.  States those wishes are for when he is in terminal state.    ED Course    Vitals: all wnl   Labs: lactate 2.1; pH venous 7.51 pco2 33; hb 5.1  Intervention: 2uprbc; 500cc IVF  Consults: Palliative; heme/onc   Images: CTA chest/AP    A few new subcentimeter lung nodules due to airway disease or metastasis. Gastric wall thickening. A new subcentimeter perigastric lymph nodes.    80-year-old [place]an-speaking man from home, walks with assistance/needs walker/wheelchair, with hypertension, IBS, hyperlipidemia, anxiety/depression, GERD, diabetes, BPH, hx prostate cancer, and metastatic gastric adenocarcinoma diagnosed in August 2024 coming for a week of nausea, generalized weakness, decreased p.o. intake, and palpitations. He has lost about 16 pounds in 2 months. He was most recently seen by [facility] and October 2020 for decided to opt out of treatment for adenocarcinoma due to advanced age. On examination he is AO x 4 but weak and pale. He denies any hematemesis, melena, hematuria, or nosebleeds. hemoglobin was noted to be 5.1.  Per paperwork family brought in earlier, he is DNI/DNR and does not want artificial nutrition/hydration/antibiotics once he has reached a terminal state.  Today, however, they would like all medical intervention except for intubation and compressions.   80-year-old Yemeni-speaking man from home, walks with assistance/needs walker/wheelchair, with hypertension, IBS, hyperlipidemia, anxiety/depression, GERD, diabetes, BPH, hx prostate cancer, and metastatic gastric adenocarcinoma diagnosed in August 2024 coming for a week of nausea, generalized weakness, decreased p.o. intake, and palpitations. He is admitted for symptomatic anemia likely in the setting of chronic disease and malnutrition.

## 2025-02-09 NOTE — ED ADULT NURSE NOTE - NSFALLHARMRISKINTERV_ED_ALL_ED

## 2025-02-09 NOTE — ED ADULT NURSE NOTE - PAIN RATING/NUMBER SCALE (0-10): ACTIVITY
Dear valued patient,    We hope this message finds you in good health. At [ ], we are committed to providing you with the best possible healthcare experience. To further enhance your convenience and streamline your access to our services, we would like to introduce you to the benefits of utilizing direct scheduling through the [facility] Patient Portal.    Direct scheduling is a feature within the [facility] Patient Portal that allows you to schedule appointments with your healthcare provider directly, without the need to make a phone call or wait for a callback. We understand that your time is tiara, and we want to ensure that you have quick and easy access to our services whenever you need them.  Here are some reasons why you should consider utilizing direct scheduling through the [facility] Patient Portal:    1. Convenience: With direct scheduling, you can book appointments at any time that suits you best, 24 hours a day, 7 days a week. No more waiting on hold or playing phone tag with our office staff. It puts you in control of managing your healthcare appointments effortlessly.    2. Accessibility: The [facility] Patient Portal is accessible through your computer, smartphone, or tablet, allowing you to schedule appointments from the comfort of your home, office, or on the go. You can access your medical records, review test results, and communicate with your healthcare provider all in one secure and user-friendly platform.    3. Timesaving: By utilizing direct scheduling, you can avoid unnecessary delays and save tiara time. You can easily browse the available appointment slots and select the one that works best for you, eliminating the back-and-forth communication typically required when scheduling via phone.    4. Reminders and notifications: [facility] Patient Portal sends automatic reminders for upcoming appointments, ensuring that you never miss an important visit. You can also receive notifications about test  6 (moderate pain)

## 2025-02-10 ENCOUNTER — TRANSCRIPTION ENCOUNTER (OUTPATIENT)
Age: 80
End: 2025-02-10

## 2025-02-10 VITALS — HEART RATE: 94 BPM | OXYGEN SATURATION: 97 % | SYSTOLIC BLOOD PRESSURE: 127 MMHG | DIASTOLIC BLOOD PRESSURE: 73 MMHG

## 2025-02-10 DIAGNOSIS — R06.02 SHORTNESS OF BREATH: ICD-10-CM

## 2025-02-10 DIAGNOSIS — Z51.5 ENCOUNTER FOR PALLIATIVE CARE: ICD-10-CM

## 2025-02-10 DIAGNOSIS — R91.1 SOLITARY PULMONARY NODULE: ICD-10-CM

## 2025-02-10 LAB
A1C WITH ESTIMATED AVERAGE GLUCOSE RESULT: 5.1 % — SIGNIFICANT CHANGE UP (ref 4–5.6)
ALBUMIN SERPL ELPH-MCNC: 3.1 G/DL — LOW (ref 3.5–5)
ALP SERPL-CCNC: 30 U/L — LOW (ref 40–120)
ALT FLD-CCNC: 18 U/L DA — SIGNIFICANT CHANGE UP (ref 10–60)
ANION GAP SERPL CALC-SCNC: 7 MMOL/L — SIGNIFICANT CHANGE UP (ref 5–17)
AST SERPL-CCNC: 20 U/L — SIGNIFICANT CHANGE UP (ref 10–40)
BASOPHILS # BLD AUTO: 0.03 K/UL — SIGNIFICANT CHANGE UP (ref 0–0.2)
BASOPHILS NFR BLD AUTO: 0.5 % — SIGNIFICANT CHANGE UP (ref 0–2)
BILIRUB SERPL-MCNC: 0.7 MG/DL — SIGNIFICANT CHANGE UP (ref 0.2–1.2)
BUN SERPL-MCNC: 12 MG/DL — SIGNIFICANT CHANGE UP (ref 7–18)
CALCIUM SERPL-MCNC: 8.8 MG/DL — SIGNIFICANT CHANGE UP (ref 8.4–10.5)
CHLORIDE SERPL-SCNC: 107 MMOL/L — SIGNIFICANT CHANGE UP (ref 96–108)
CO2 SERPL-SCNC: 25 MMOL/L — SIGNIFICANT CHANGE UP (ref 22–31)
CREAT SERPL-MCNC: 0.7 MG/DL — SIGNIFICANT CHANGE UP (ref 0.5–1.3)
CULTURE RESULTS: SIGNIFICANT CHANGE UP
EGFR: 93 ML/MIN/1.73M2 — SIGNIFICANT CHANGE UP
EOSINOPHIL # BLD AUTO: 0.06 K/UL — SIGNIFICANT CHANGE UP (ref 0–0.5)
EOSINOPHIL NFR BLD AUTO: 0.9 % — SIGNIFICANT CHANGE UP (ref 0–6)
ESTIMATED AVERAGE GLUCOSE: 100 MG/DL — SIGNIFICANT CHANGE UP (ref 68–114)
GLUCOSE BLDC GLUCOMTR-MCNC: 118 MG/DL — HIGH (ref 70–99)
GLUCOSE BLDC GLUCOMTR-MCNC: 125 MG/DL — HIGH (ref 70–99)
GLUCOSE SERPL-MCNC: 120 MG/DL — HIGH (ref 70–99)
HCT VFR BLD CALC: 22.5 % — LOW (ref 39–50)
HCT VFR BLD CALC: 23 % — LOW (ref 39–50)
HGB BLD-MCNC: 7.3 G/DL — LOW (ref 13–17)
HGB BLD-MCNC: 7.6 G/DL — LOW (ref 13–17)
IMM GRANULOCYTES NFR BLD AUTO: 0.5 % — SIGNIFICANT CHANGE UP (ref 0–0.9)
LYMPHOCYTES # BLD AUTO: 1.89 K/UL — SIGNIFICANT CHANGE UP (ref 1–3.3)
LYMPHOCYTES # BLD AUTO: 28.4 % — SIGNIFICANT CHANGE UP (ref 13–44)
MCHC RBC-ENTMCNC: 25.4 PG — LOW (ref 27–34)
MCHC RBC-ENTMCNC: 25.6 PG — LOW (ref 27–34)
MCHC RBC-ENTMCNC: 32.4 G/DL — SIGNIFICANT CHANGE UP (ref 32–36)
MCHC RBC-ENTMCNC: 33 G/DL — SIGNIFICANT CHANGE UP (ref 32–36)
MCV RBC AUTO: 77.4 FL — LOW (ref 80–100)
MCV RBC AUTO: 78.4 FL — LOW (ref 80–100)
MONOCYTES # BLD AUTO: 0.91 K/UL — HIGH (ref 0–0.9)
MONOCYTES NFR BLD AUTO: 13.7 % — SIGNIFICANT CHANGE UP (ref 2–14)
NEUTROPHILS # BLD AUTO: 3.74 K/UL — SIGNIFICANT CHANGE UP (ref 1.8–7.4)
NEUTROPHILS NFR BLD AUTO: 56 % — SIGNIFICANT CHANGE UP (ref 43–77)
NRBC # BLD: 0 /100 WBCS — SIGNIFICANT CHANGE UP (ref 0–0)
NRBC # BLD: 0 /100 WBCS — SIGNIFICANT CHANGE UP (ref 0–0)
NRBC BLD-RTO: 0 /100 WBCS — SIGNIFICANT CHANGE UP (ref 0–0)
NRBC BLD-RTO: 0 /100 WBCS — SIGNIFICANT CHANGE UP (ref 0–0)
PLATELET # BLD AUTO: 247 K/UL — SIGNIFICANT CHANGE UP (ref 150–400)
PLATELET # BLD AUTO: 248 K/UL — SIGNIFICANT CHANGE UP (ref 150–400)
POTASSIUM SERPL-MCNC: 4.4 MMOL/L — SIGNIFICANT CHANGE UP (ref 3.5–5.3)
POTASSIUM SERPL-SCNC: 4.4 MMOL/L — SIGNIFICANT CHANGE UP (ref 3.5–5.3)
PROT SERPL-MCNC: 5.8 G/DL — LOW (ref 6–8.3)
RBC # BLD: 2.87 M/UL — LOW (ref 4.2–5.8)
RBC # BLD: 2.97 M/UL — LOW (ref 4.2–5.8)
RBC # FLD: 17.5 % — HIGH (ref 10.3–14.5)
RBC # FLD: 17.8 % — HIGH (ref 10.3–14.5)
SODIUM SERPL-SCNC: 139 MMOL/L — SIGNIFICANT CHANGE UP (ref 135–145)
SPECIMEN SOURCE: SIGNIFICANT CHANGE UP
WBC # BLD: 6.66 K/UL — SIGNIFICANT CHANGE UP (ref 3.8–10.5)
WBC # BLD: 7.56 K/UL — SIGNIFICANT CHANGE UP (ref 3.8–10.5)
WBC # FLD AUTO: 6.66 K/UL — SIGNIFICANT CHANGE UP (ref 3.8–10.5)
WBC # FLD AUTO: 7.56 K/UL — SIGNIFICANT CHANGE UP (ref 3.8–10.5)

## 2025-02-10 PROCEDURE — 82330 ASSAY OF CALCIUM: CPT

## 2025-02-10 PROCEDURE — 85027 COMPLETE CBC AUTOMATED: CPT

## 2025-02-10 PROCEDURE — 85025 COMPLETE CBC W/AUTO DIFF WBC: CPT

## 2025-02-10 PROCEDURE — 84295 ASSAY OF SERUM SODIUM: CPT

## 2025-02-10 PROCEDURE — 96374 THER/PROPH/DIAG INJ IV PUSH: CPT

## 2025-02-10 PROCEDURE — T1013: CPT

## 2025-02-10 PROCEDURE — 36430 TRANSFUSION BLD/BLD COMPNT: CPT

## 2025-02-10 PROCEDURE — 83605 ASSAY OF LACTIC ACID: CPT

## 2025-02-10 PROCEDURE — 86923 COMPATIBILITY TEST ELECTRIC: CPT

## 2025-02-10 PROCEDURE — 84100 ASSAY OF PHOSPHORUS: CPT

## 2025-02-10 PROCEDURE — 99291 CRITICAL CARE FIRST HOUR: CPT | Mod: 25

## 2025-02-10 PROCEDURE — 82803 BLOOD GASES ANY COMBINATION: CPT

## 2025-02-10 PROCEDURE — 84484 ASSAY OF TROPONIN QUANT: CPT

## 2025-02-10 PROCEDURE — 74177 CT ABD & PELVIS W/CONTRAST: CPT | Mod: MC

## 2025-02-10 PROCEDURE — 80053 COMPREHEN METABOLIC PANEL: CPT

## 2025-02-10 PROCEDURE — 87637 SARSCOV2&INF A&B&RSV AMP PRB: CPT

## 2025-02-10 PROCEDURE — 0241U: CPT

## 2025-02-10 PROCEDURE — 83735 ASSAY OF MAGNESIUM: CPT

## 2025-02-10 PROCEDURE — P9040: CPT

## 2025-02-10 PROCEDURE — 85610 PROTHROMBIN TIME: CPT

## 2025-02-10 PROCEDURE — 36415 COLL VENOUS BLD VENIPUNCTURE: CPT

## 2025-02-10 PROCEDURE — 71045 X-RAY EXAM CHEST 1 VIEW: CPT

## 2025-02-10 PROCEDURE — 82962 GLUCOSE BLOOD TEST: CPT

## 2025-02-10 PROCEDURE — 86850 RBC ANTIBODY SCREEN: CPT

## 2025-02-10 PROCEDURE — 99223 1ST HOSP IP/OBS HIGH 75: CPT

## 2025-02-10 PROCEDURE — 84132 ASSAY OF SERUM POTASSIUM: CPT

## 2025-02-10 PROCEDURE — 83036 HEMOGLOBIN GLYCOSYLATED A1C: CPT

## 2025-02-10 PROCEDURE — 71275 CT ANGIOGRAPHY CHEST: CPT | Mod: MC

## 2025-02-10 PROCEDURE — 86900 BLOOD TYPING SEROLOGIC ABO: CPT

## 2025-02-10 PROCEDURE — 85730 THROMBOPLASTIN TIME PARTIAL: CPT

## 2025-02-10 PROCEDURE — 83690 ASSAY OF LIPASE: CPT

## 2025-02-10 PROCEDURE — 93005 ELECTROCARDIOGRAM TRACING: CPT

## 2025-02-10 PROCEDURE — 83880 ASSAY OF NATRIURETIC PEPTIDE: CPT

## 2025-02-10 PROCEDURE — 81001 URINALYSIS AUTO W/SCOPE: CPT

## 2025-02-10 PROCEDURE — 86901 BLOOD TYPING SEROLOGIC RH(D): CPT

## 2025-02-10 PROCEDURE — 87086 URINE CULTURE/COLONY COUNT: CPT

## 2025-02-10 PROCEDURE — 99497 ADVNCD CARE PLAN 30 MIN: CPT | Mod: 25

## 2025-02-10 RX ORDER — ONDANSETRON 4 MG/1
1 TABLET, ORALLY DISINTEGRATING ORAL
Qty: 90 | Refills: 0
Start: 2025-02-10 | End: 2025-03-11

## 2025-02-10 RX ADMIN — PANTOPRAZOLE 40 MILLIGRAM(S): 20 TABLET, DELAYED RELEASE ORAL at 05:32

## 2025-02-10 RX ADMIN — DRONABINOL 2.5 MILLIGRAM(S): 5 CAPSULE ORAL at 05:32

## 2025-02-10 RX ADMIN — Medication 20 MILLIGRAM(S): at 05:32

## 2025-02-10 RX ADMIN — Medication 5 MILLIGRAM(S): at 05:32

## 2025-02-10 RX ADMIN — Medication 1 MILLIGRAM(S): at 12:02

## 2025-02-10 RX ADMIN — OLANZAPINE 5 MILLIGRAM(S): 10 TABLET, FILM COATED ORAL at 12:02

## 2025-02-10 NOTE — CONSULT NOTE ADULT - SUBJECTIVE AND OBJECTIVE BOX
Consult to: Discuss complex medical decision making related to goals of care    [facility] Geriatric and Palliative Consult Service:  Whit Hinkle DO: cell ([phone])  Guzman Sterling MD: cell ([phone])  Neymar Mclain NP: cell ([phone])   Jessica Fleischer-Black MD:  cell ([phone])  Piero Farrell SW: cell ([phone])     Can contact any Palliative Team member via Microsoft Teams for consults and questions      HPI:  80-year-old Bulgarian-speaking man from home, walks with assistance/needs walker/wheelchair, with hypertension, IBS, hyperlipidemia, anxiety/depression, GERD, diabetes, BPH, hx prostate cancer, and metastatic gastric adenocarcinoma diagnosed in August 2024 coming for a week of nausea, generalized weakness, decreased p.o. intake, and palpitations. He has lost about 16 pounds in 2 months. He was most recently seen by [facility] in October 2024 and decided to opt out of treatment for adenocarcinoma due to advanced age. On examination he is AO4 but weak and pale. He denies any hematemesis, melena, hematuria, or nosebleeds. Hemoglobin was noted to be 5.1.  Per paperwork family brought in earlier, he is DNI/DNR and does not want artificial nutrition/hydration/antibiotics once he has reached a terminal state.  Today, however, they would like all medical intervention except for intubation and compressions.    ED Course    Vitals: all wnl   Labs: lactate 2.1; pH venous 7.51 pco2 33; hb 5.1  Intervention: 2uprbc; 500cc IVF  Consults: Palliative; heme/onc   Images: CTA chest/AP: A few new subcentimeter lung nodules due to airway disease or metastasis. Gastric wall thickening. A new subcentimeter perigastric lymph nodes.  (09 Feb 2025 17:13)      PAST MEDICAL & SURGICAL HISTORY:  HTN (hypertension)      HLD (hyperlipidemia)      Liver cirrhosis      Adenoma      Gastric reflux      DM (diabetes mellitus)      Hepatitis C      History of appendectomy      H/O cataract extraction          SOCIAL HISTORY:    Admitted from:  home  (with HHA)           assisted living          [facility]       LTC   [ none ] Substance abuse, [ none ] Tobacco hx, [ none ] Alcohol hx, [ none ] Home Opioid Hx    FAMILY HISTORY:   unable to obtain from patient due to poor mentation, family unable to give information, see H&P for history  Baseline ADLs (prior to admission):    Roman Catholic:    Allergies    No Known Allergies    Intolerances      Present Symptoms: Mild, Moderate, Severe  Pain:             Location -                               Aggravating factors -             Quality -             Radiation -             Timing-             Severity (0-10 scale):             Minimal acceptable level (0-10 scale):  Fatigue:  denies  Nausea:  denies  Lack of Appetite:  denies  SOB: denies  Depression:  denies  Anxiety:  denies  Constipation:  denies     Review of Systems:  All other systems reviewed and are negative OR Unable to obtain due to poor mentation      CPOT:    https://ccs-st.org/resources/Documents/Sedation%20Analgesia%20Delirium%20in%20CC/CCS%20STH%20Guideline%20template%20CPOT.pdf  PAIN AD Score:   [email or web address]/[email or web address] (press ctrl +  left click to view)      MEDICATIONS  (STANDING):  amLODIPine   Tablet 5 milliGRAM(s) Oral daily  benztropine 1 milliGRAM(s) Oral daily  bisacodyl 5 milliGRAM(s) Oral at bedtime  dronabinol 2.5 milliGRAM(s) Oral two times a day  influenza  Vaccine (HIGH DOSE) 0.5 milliLiter(s) IntraMuscular once  insulin lispro (ADMELOG) corrective regimen sliding scale   SubCutaneous three times a day before meals  insulin lispro (ADMELOG) corrective regimen sliding scale   SubCutaneous at bedtime  lactated ringers. 1000 milliLiter(s) (75 mL/Hr) IV Continuous <Continuous>  lisinopril 20 milliGRAM(s) Oral daily  OLANZapine 5 milliGRAM(s) Oral daily  paliperidone ER. 6 milliGRAM(s) Oral at bedtime  pantoprazole    Tablet 40 milliGRAM(s) Oral before breakfast  rosuvastatin 10 milliGRAM(s) Oral at bedtime  tamsulosin 0.4 milliGRAM(s) Oral at bedtime    MEDICATIONS  (PRN):  aluminum hydroxide/magnesium hydroxide/simethicone Suspension 30 milliLiter(s) Oral every 4 hours PRN Dyspepsia  melatonin 3 milliGRAM(s) Oral at bedtime PRN Insomnia  ondansetron Injectable 4 milliGRAM(s) IV Push every 8 hours PRN Nausea and/or Vomiting      PHYSICAL EXAM:  Vital Signs Last 24 Hrs  T(C): 36.3 (10 Feb 2025 08:00), Max: 36.9 (09 Feb 2025 16:27)  T(F): 97.3 (10 Feb 2025 08:00), Max: 98.5 (09 Feb 2025 16:27)  HR: 65 (10 Feb 2025 08:00) (65 - 86)  BP: 117/66 (10 Feb 2025 08:00) (110/60 - 138/68)  BP(mean): 74 (09 Feb 2025 19:41) (74 - 74)  RR: 18 (10 Feb 2025 08:00) (17 - 26)  SpO2: 96% (10 Feb 2025 08:00) (96% - 100%)    Parameters below as of 10 Feb 2025 08:00  Patient On (Oxygen Delivery Method): room air        General: alert  oriented x ____    lethargic distressed cachexia  nonverbal  unarousable verbal    Palliative Performance Scale/Karnofsky Score:  http://npcrc.org/files/news/palliative_performance_scale_ppsv2.pdf    HEENT:  EOMI anicteric, pharynx clear, MM moist  Lungs: tachypnea/labored breathing, clear to auscultation, no congestion noted  CV: RRR, tachycardic, normal S1 and S2, no murmur  GI: soft non distended non tender   + normal bowel sounds  : incontinent  oliguria/anuria  chandler  Musculoskeletal: weakness x4  in all extremities, ambulatory with assistance   mostly/fully bedbound/wheelchair bound, no edema noted  Skin: no abnormal skin lesions or DU noted, poor skin turgor  Neuro: no deficits, mild cognitive impairment dsyphagia/dysarthria paresis  Oral intake ability: unable/only mouth care, minimal moderate full capability    LABS:                        7.6    6.66  )-----------( 247      ( 10 Feb 2025 05:30 )             23.0     02-10    139  |  107  |  12  ----------------------------<  120[H]  4.4   |  25  |  0.70    Ca    8.8      10 Feb 2025 05:30  Phos  2.4     02-09  Mg     1.9     02-09    TPro  5.8[L]  /  Alb  3.1[L]  /  TBili  0.7  /  DBili  x   /  AST  20  /  ALT  18  /  AlkPhos  30[L]  02-10    Urinalysis Basic - ( 10 Feb 2025 05:30 )    Color: x / Appearance: x / SG: x / pH: x  Gluc: 120 mg/dL / Ketone: x  / Bili: x / Urobili: x   Blood: x / Protein: x / Nitrite: x   Leuk Esterase: x / RBC: x / WBC x   Sq Epi: x / Non Sq Epi: x / Bacteria: x        RADIOLOGY & ADDITIONAL STUDIES:     Consult to: Discuss complex medical decision making related to goals of care    [facility] Geriatric and Palliative Consult Service:  Whitfernando Hinkle DO: cell ([phone])  Guzman Sterling MD: cell ([phone])  Neymar Mclain NP: cell ([phone])   Jessica Fleischer-Black MD:  cell ([phone])  Piero Farrell SW: cell ([phone])     Can contact any Palliative Team member via Microsoft Teams for consults and questions      HPI:  80-year-old Guatemalan-speaking man from home, walks with assistance/needs walker/wheelchair, with hypertension, IBS, hyperlipidemia, anxiety/depression, GERD, diabetes, BPH, hx prostate cancer, and metastatic gastric adenocarcinoma diagnosed in August 2024 coming for a week of nausea, generalized weakness, decreased p.o. intake, and palpitations. He has lost about 16 pounds in 2 months. He was most recently seen by [facility] in October 2024 and decided to opt out of treatment for adenocarcinoma due to advanced age. On examination he is AO4 but weak and pale. He denies any hematemesis, melena, hematuria, or nosebleeds. Hemoglobin was noted to be 5.1.  Per paperwork family brought in earlier, he is DNI/DNR and does not want artificial nutrition/hydration/antibiotics once he has reached a terminal state.  Today, however, they would like all medical intervention except for intubation and compressions.    ED Course    Vitals: all wnl   Labs: lactate 2.1; pH venous 7.51 pco2 33; hb 5.1  Intervention: 2uprbc; 500cc IVF  Consults: Palliative; heme/onc   Images: CTA chest/AP: A few new subcentimeter lung nodules due to airway disease or metastasis. Gastric wall thickening. A new subcentimeter perigastric lymph nodes.  (09 Feb 2025 17:13)    Patient was admitted for symptomatic anemia likely in the setting of chronic disease and malnutrition.  Transfused 2 units PRBCs, and being transfused a 3rd unit currently.  Palliative Care consultation requested for complex medical decision making and additional GOC discussion.      HIE reviewed--Patient admitted to [facility] in early July 2024 for nausea/vomiting/abdominal pain; found to have appendicitis s/p appendectomy, + incidental finding of adenocarcinoma (surgical margins clear).  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  Was referred to Dr. Mcmanus at [facility], who recommended neoadjuvant chemo + surgery, but patient declined treatment, last seen by [facility] in October 2024.      Patient examined at bedside earlier this morning.  History obtained with use of Guatemalan speaking  via video interpretation service (ID# [id number], Alison).  Alert and oriented x 3.  Denies pain, chest pain, or SOB.  Still c/o mild weakness, but overall feels much better following transfusion.  + occ nausea, but denies vomiting or diarrhea.  No other acute complaints.         PAST MEDICAL & SURGICAL HISTORY:  HTN (hypertension)      HLD (hyperlipidemia)      Liver cirrhosis      Adenoma      Gastric reflux      DM (diabetes mellitus)      Hepatitis C      History of appendectomy      H/O cataract extraction          SOCIAL HISTORY:    Admitted from:  home   lives with wife, two daughters live close by    A 4 hrs x 7 days  [ none ] Substance abuse, [ none ] Tobacco hx, [ none ] Alcohol hx, [ none ] Home Opioid Hx    FAMILY HISTORY:  Unable to obtain from patient due to poor mentation, family unable to give information, see H&P for history  Baseline ADLs (prior to admission):  ambulatory short distances with assistance (requesting walker), otherwise mostly functional in ADLs, requires minimal assistance (HHA primarily helps with cooking and house cleaning).  Continent, feeds self, has good understanding of his underlying anemia and cancer diagnoses.      Synagogue:  Evangel[employer]    Allergies    No Known Allergies    Intolerances      Present Symptoms: Mild, Moderate, Severe  Pain:  Denies presently, 0/10  Fatigue: mild  Nausea:  mild  Lack of Appetite:  denies  SOB: denies  Depression:  denies  Anxiety:  denies  Constipation:  denies     Review of Systems:  All other systems reviewed and are negative       MEDICATIONS  (STANDING):  amLODIPine   Tablet 5 milliGRAM(s) Oral daily  benztropine 1 milliGRAM(s) Oral daily  bisacodyl 5 milliGRAM(s) Oral at bedtime  dronabinol 2.5 milliGRAM(s) Oral two times a day  influenza  Vaccine (HIGH DOSE) 0.5 milliLiter(s) IntraMuscular once  insulin lispro (ADMELOG) corrective regimen sliding scale   SubCutaneous three times a day before meals  insulin lispro (ADMELOG) corrective regimen sliding scale   SubCutaneous at bedtime  lactated ringers. 1000 milliLiter(s) (75 mL/Hr) IV Continuous <Continuous>  lisinopril 20 milliGRAM(s) Oral daily  OLANZapine 5 milliGRAM(s) Oral daily  paliperidone ER. 6 milliGRAM(s) Oral at bedtime  pantoprazole    Tablet 40 milliGRAM(s) Oral before breakfast  rosuvastatin 10 milliGRAM(s) Oral at bedtime  tamsulosin 0.4 milliGRAM(s) Oral at bedtime    MEDICATIONS  (PRN):  aluminum hydroxide/magnesium hydroxide/simethicone Suspension 30 milliLiter(s) Oral every 4 hours PRN Dyspepsia  melatonin 3 milliGRAM(s) Oral at bedtime PRN Insomnia  ondansetron Injectable 4 milliGRAM(s) IV Push every 8 hours PRN Nausea and/or Vomiting      PHYSICAL EXAM:  Vital Signs Last 24 Hrs  T(C): 36.3 (10 Feb 2025 08:00), Max: 36.9 (09 Feb 2025 16:27)  T(F): 97.3 (10 Feb 2025 08:00), Max: 98.5 (09 Feb 2025 16:27)  HR: 65 (10 Feb 2025 08:00) (65 - 86)  BP: 117/66 (10 Feb 2025 08:00) (110/60 - 138/68)  BP(mean): 74 (09 Feb 2025 19:41) (74 - 74)  RR: 18 (10 Feb 2025 08:00) (17 - 26)  SpO2: 96% (10 Feb 2025 08:00) (96% - 100%)    Parameters below as of 10 Feb 2025 08:00  Patient On (Oxygen Delivery Method): room air        General: alert  oriented x _3___    awake, conversant, scant temporal wasting, in NAD.    Palliative Performance Scale/Karnofsky Score:  50%  http://npcrc.org/files/news/palliative_performance_scale_ppsv2.pdf    HEENT:  EOMI anicteric, pharynx clear, MM moist  Lungs:  overall clear to auscultation, respirations unlabored  CV: RRR, tachycardic, normal S1 and S2, no murmur  GI: soft non distended non tender   + normal bowel sounds  :  urinating  Musculoskeletal:  ambulatory with assistance, no edema, no focal abnormal joint findings identified  Skin: no abnormal skin lesions or DU noted  Neuro: no focal deficits  Oral intake ability:  moderate to full capability, on regular diet      LABS:                        7.6    6.66  )-----------( 247      ( 10 Feb 2025 05:30 )             23.0     02-10    139  |  107  |  12  ----------------------------<  120[H]  4.4   |  25  |  0.70    Ca    8.8      10 Feb 2025 05:30  Phos  2.4     02-09  Mg     1.9     02-09    TPro  5.8[L]  /  Alb  3.1[L]  /  TBili  0.7  /  DBili  x   /  AST  20  /  ALT  18  /  AlkPhos  30[L]  02-10    Urinalysis Basic - ( 10 Feb 2025 05:30 )    Color: x / Appearance: x / SG: x / pH: x  Gluc: 120 mg/dL / Ketone: x  / Bili: x / Urobili: x   Blood: x / Protein: x / Nitrite: x   Leuk Esterase: x / RBC: x / WBC x   Sq Epi: x / Non Sq Epi: x / Bacteria: x        RADIOLOGY & ADDITIONAL STUDIES:      ACC: [id number] EXAM:  XR CHEST PORTABLE URGENT 1V   ORDERED BY: KRYSTLE GUZMAN     PROCEDURE DATE:  02/09/2025          INTERPRETATION:  Clinical history chest pain    Comparison 12/1/2022    A single frontal view of the chest demonstrates no evidence of effusion,   consolidation or pneumothorax.    Impression:    Normal chest    --- End of Report ---        ACC: [id number] EXAM:  CT ABDOMEN AND PELVIS IC   ORDERED BY: KRYSTLE GUZMAN     ACC: [id number] EXAM:  CT ANGIO CHEST PULM ART WAWIC   ORDERED BY:   KRYSTLE GUZMAN     PROCEDURE DATE:  02/09/2025          INTERPRETATION:  CLINICAL INFORMATION: Shortness of breath. Abdominal   pain history of gastric cancer, colon/prostate cancer.    COMPARISON: 8/13/2024 and 7/20/2024    CONTRAST/COMPLICATIONS:  IV Contrast: Omnipaque 350  90 cc administered   10 cc discarded  Oral Contrast: NONE  .    PROCEDURE:  CT Angiography of the Chest was performed followed by portal venous phase   imaging of the Abdomen and Pelvis.  Sagittal and coronal reformats were performed as well as 3D (MIP)   reconstructions.    FINDINGS:  CHEST:  LUNGS AND LARGE AIRWAYS: Patent central airways. A a few small nodules in   the right upper lobe and right lower lobe measuring up to 5 mm (15-92),   not seen on the prior study. No pulmonary nodules.  PLEURA: No pleural effusion.  VESSELS: No pulmonary emboli. No thoracic aorticaneurysm.  HEART: Heart size is normal. Extensive coronary calcifications. No   pericardial effusion.  MEDIASTINUM AND JULIANNA: Mildly enlarged bilateral hilar lymph nodes,   similar to the prior studies. No mediastinal or axillary lymphadenopathy.  CHEST WALL AND LOWER NECK: Mild gynecomastia.    ABDOMEN AND PELVIS:  LIVER: 0.9 cm cyst segment 7.  BILE DUCTS: Normal caliber.  GALLBLADDER: Cholelithiasis.  SPLEEN: Coarse calcification again noted in the posterior aspect.  PANCREAS: Within normal limits.  ADRENALS: Within normal limits.  KIDNEYS/URETERS: Unremarkable right kidney. A couple of small cysts left   kidney.    BLADDER: Within normal limits.  REPRODUCTIVE ORGANS: Prostate is enlarged.    BOWEL: Small hiatal hernia. Prominent mucosal enhancement and wall   thickening of the pyloric channel. No bowel obstruction. Appendectomy.  PERITONEUM/RETROPERITONEUM: Within normal limits.  VESSELS: Atherosclerotic changes. No aneurysm.  LYMPH NODES: 0.8 cm perigastric lymph node is new since prior studies. No   retroperitoneal lymphadenopathy.  ABDOMINAL WALL: Within normal limits.  BONES: Degenerative changes.    IMPRESSION:    No pulmonary embolism.    A few new subcentimeter lung nodules due to airway disease or metastasis.   Recommend short-term follow-up in 3 months.    Gastric wall thickening. A new subcentimeter perigastric lymph node.    No acute abnormality in the abdomen and pelvis.        --- End of Report ---               Consult to: Discuss complex medical decision making related to goals of care    [facility] Geriatric and Palliative Consult Service:  Whitfernando Hinkle DO: cell ([phone])  Guzman Sterling MD: cell ([phone])  Neymar Mclain NP: cell ([phone])   Jessica Fleischer-Black MD:  cell ([phone])  Piero Farrell SW: cell ([phone])     Can contact any Palliative Team member via [employer] Teams for consults and questions      HPI:  80-year-old Maltese-speaking man from home, walks with assistance/needs walker/wheelchair, with hypertension, IBS, hyperlipidemia, anxiety/depression, GERD, diabetes, BPH, hx prostate cancer, and metastatic gastric adenocarcinoma diagnosed in August 2024 coming for a week of nausea, generalized weakness, decreased p.o. intake, and palpitations. He has lost about 16 pounds in 2 months. He was most recently seen by [facility] in October 2024 and decided to opt out of treatment for adenocarcinoma due to advanced age. On examination he is AO4 but weak and pale. He denies any hematemesis, melena, hematuria, or nosebleeds. Hemoglobin was noted to be 5.1.  Per paperwork family brought in earlier, he is DNI/DNR and does not want artificial nutrition/hydration/antibiotics once he has reached a terminal state.  Today, however, they would like all medical intervention except for intubation and compressions.    ED Course    Vitals: all wnl   Labs: lactate 2.1; pH venous 7.51 pco2 33; hb 5.1  Intervention: 2uprbc; 500cc IVF  Consults: Palliative; heme/onc   Images: CTA chest/AP: A few new subcentimeter lung nodules due to airway disease or metastasis. Gastric wall thickening. A new subcentimeter perigastric lymph nodes.  (09 Feb 2025 17:13)    Patient was admitted for symptomatic anemia likely in the setting of chronic disease and malnutrition.  Transfused 2 units PRBCs, and being transfused a 3rd unit currently.  Palliative Care consultation requested for complex medical decision making and additional GOC discussion.      HIE reviewed--Patient admitted to [facility] in early July 2024 for nausea/vomiting/abdominal pain; found to have appendicitis s/p appendectomy, + incidental finding of adenocarcinoma (surgical margins clear).  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  Was referred to Dr. Mcmanus at [facility], who recommended neoadjuvant chemo + surgery, but patient declined treatment, last seen by [facility] in October 2024.      Patient examined at bedside earlier this morning.  History obtained with use of Maltese speaking  via video interpretation service (ID# [id number], Alison).  Alert and oriented x 3.  Denies pain, chest pain, or SOB.  Still c/o mild weakness, but overall feels much better following transfusion.  + occ nausea, but denies vomiting or diarrhea.  No other acute complaints.         PAST MEDICAL & SURGICAL HISTORY:  HTN (hypertension)      HLD (hyperlipidemia)      Liver cirrhosis      Adenoma      Gastric reflux      DM (diabetes mellitus)      Hepatitis C      History of appendectomy      H/O cataract extraction          SOCIAL HISTORY:    Admitted from:  home   lives with wife, two daughters live close by    A 4 hrs x 7 days  [ none ] Substance abuse, [ none ] Tobacco hx, [ none ] Alcohol hx, [ none ] Home Opioid Hx    FAMILY HISTORY:  Unable to obtain from patient due to poor mentation, family unable to give information, see H&P for history  Baseline ADLs (prior to admission):  ambulatory short distances with assistance (requesting walker), otherwise mostly functional in ADLs, requires minimal assistance (HHA primarily helps with cooking and house cleaning).  Continent, feeds self, has good understanding of his underlying anemia and cancer diagnoses.      Restoration:  Quaker    Allergies    No Known Allergies    Intolerances      Present Symptoms: Mild, Moderate, Severe  Pain:  Denies presently, 0/10  Fatigue: mild  Nausea:  mild  Lack of Appetite:  denies  SOB: denies  Depression:  denies  Anxiety:  denies  Constipation:  denies     Review of Systems:  All other systems reviewed and are negative       MEDICATIONS  (STANDING):  amLODIPine   Tablet 5 milliGRAM(s) Oral daily  benztropine 1 milliGRAM(s) Oral daily  bisacodyl 5 milliGRAM(s) Oral at bedtime  dronabinol 2.5 milliGRAM(s) Oral two times a day  influenza  Vaccine (HIGH DOSE) 0.5 milliLiter(s) IntraMuscular once  insulin lispro (ADMELOG) corrective regimen sliding scale   SubCutaneous three times a day before meals  insulin lispro (ADMELOG) corrective regimen sliding scale   SubCutaneous at bedtime  lactated ringers. 1000 milliLiter(s) (75 mL/Hr) IV Continuous <Continuous>  lisinopril 20 milliGRAM(s) Oral daily  OLANZapine 5 milliGRAM(s) Oral daily  paliperidone ER. 6 milliGRAM(s) Oral at bedtime  pantoprazole    Tablet 40 milliGRAM(s) Oral before breakfast  rosuvastatin 10 milliGRAM(s) Oral at bedtime  tamsulosin 0.4 milliGRAM(s) Oral at bedtime    MEDICATIONS  (PRN):  aluminum hydroxide/magnesium hydroxide/simethicone Suspension 30 milliLiter(s) Oral every 4 hours PRN Dyspepsia  melatonin 3 milliGRAM(s) Oral at bedtime PRN Insomnia  ondansetron Injectable 4 milliGRAM(s) IV Push every 8 hours PRN Nausea and/or Vomiting      PHYSICAL EXAM:  Vital Signs Last 24 Hrs  T(C): 36.3 (10 Feb 2025 08:00), Max: 36.9 (09 Feb 2025 16:27)  T(F): 97.3 (10 Feb 2025 08:00), Max: 98.5 (09 Feb 2025 16:27)  HR: 65 (10 Feb 2025 08:00) (65 - 86)  BP: 117/66 (10 Feb 2025 08:00) (110/60 - 138/68)  BP(mean): 74 (09 Feb 2025 19:41) (74 - 74)  RR: 18 (10 Feb 2025 08:00) (17 - 26)  SpO2: 96% (10 Feb 2025 08:00) (96% - 100%)    Parameters below as of 10 Feb 2025 08:00  Patient On (Oxygen Delivery Method): room air        General: alert  oriented x _3___    awake, conversant, scant temporal wasting, in NAD.    Palliative Performance Scale/Karnofsky Score:  50%  http://npcrc.org/files/news/palliative_performance_scale_ppsv2.pdf    HEENT:  EOMI anicteric, pharynx clear, MM moist  Lungs:  overall clear to auscultation, respirations unlabored  CV: RRR, tachycardic, normal S1 and S2, no murmur  GI: soft non distended non tender   + normal bowel sounds  :  urinating  Musculoskeletal:  ambulatory with assistance, no edema, no focal abnormal joint findings identified  Skin: no abnormal skin lesions or DU noted  Neuro: no focal deficits  Oral intake ability:  moderate to full capability, on regular diet      LABS:                        7.6    6.66  )-----------( 247      ( 10 Feb 2025 05:30 )             23.0     02-10    139  |  107  |  12  ----------------------------<  120[H]  4.4   |  25  |  0.70    Ca    8.8      10 Feb 2025 05:30  Phos  2.4     02-09  Mg     1.9     02-09    TPro  5.8[L]  /  Alb  3.1[L]  /  TBili  0.7  /  DBili  x   /  AST  20  /  ALT  18  /  AlkPhos  30[L]  02-10    Urinalysis Basic - ( 10 Feb 2025 05:30 )    Color: x / Appearance: x / SG: x / pH: x  Gluc: 120 mg/dL / Ketone: x  / Bili: x / Urobili: x   Blood: x / Protein: x / Nitrite: x   Leuk Esterase: x / RBC: x / WBC x   Sq Epi: x / Non Sq Epi: x / Bacteria: x        RADIOLOGY & ADDITIONAL STUDIES:      ACC: [id number] EXAM:  XR CHEST PORTABLE URGENT 1V   ORDERED BY: KRYSTLE GUZMAN     PROCEDURE DATE:  02/09/2025          INTERPRETATION:  Clinical history chest pain    Comparison 12/1/2022    A single frontal view of the chest demonstrates no evidence of effusion,   consolidation or pneumothorax.    Impression:    Normal chest    --- End of Report ---        ACC: [id number] EXAM:  CT ABDOMEN AND PELVIS IC   ORDERED BY: KRYSTLE GUZMAN     ACC: [id number] EXAM:  CT ANGIO CHEST PULM ART WAWIC   ORDERED BY:   KRYSTLE GUZMAN     PROCEDURE DATE:  02/09/2025          INTERPRETATION:  CLINICAL INFORMATION: Shortness of breath. Abdominal   pain history of gastric cancer, colon/prostate cancer.    COMPARISON: 8/13/2024 and 7/20/2024    CONTRAST/COMPLICATIONS:  IV Contrast: Omnipaque 350  90 cc administered   10 cc discarded  Oral Contrast: NONE  .    PROCEDURE:  CT Angiography of the Chest was performed followed by portal venous phase   imaging of the Abdomen and Pelvis.  Sagittal and coronal reformats were performed as well as 3D (MIP)   reconstructions.    FINDINGS:  CHEST:  LUNGS AND LARGE AIRWAYS: Patent central airways. A a few small nodules in   the right upper lobe and right lower lobe measuring up to 5 mm (15-92),   not seen on the prior study. No pulmonary nodules.  PLEURA: No pleural effusion.  VESSELS: No pulmonary emboli. No thoracic aorticaneurysm.  HEART: Heart size is normal. Extensive coronary calcifications. No   pericardial effusion.  MEDIASTINUM AND JULIANNA: Mildly enlarged bilateral hilar lymph nodes,   similar to the prior studies. No mediastinal or axillary lymphadenopathy.  CHEST WALL AND LOWER NECK: Mild gynecomastia.    ABDOMEN AND PELVIS:  LIVER: 0.9 cm cyst segment 7.  BILE DUCTS: Normal caliber.  GALLBLADDER: Cholelithiasis.  SPLEEN: Coarse calcification again noted in the posterior aspect.  PANCREAS: Within normal limits.  ADRENALS: Within normal limits.  KIDNEYS/URETERS: Unremarkable right kidney. A couple of small cysts left   kidney.    BLADDER: Within normal limits.  REPRODUCTIVE ORGANS: Prostate is enlarged.    BOWEL: Small hiatal hernia. Prominent mucosal enhancement and wall   thickening of the pyloric channel. No bowel obstruction. Appendectomy.  PERITONEUM/RETROPERITONEUM: Within normal limits.  VESSELS: Atherosclerotic changes. No aneurysm.  LYMPH NODES: 0.8 cm perigastric lymph node is new since prior studies. No   retroperitoneal lymphadenopathy.  ABDOMINAL WALL: Within normal limits.  BONES: Degenerative changes.    IMPRESSION:    No pulmonary embolism.    A few new subcentimeter lung nodules due to airway disease or metastasis.   Recommend short-term follow-up in 3 months.    Gastric wall thickening. A new subcentimeter perigastric lymph node.    No acute abnormality in the abdomen and pelvis.      --- End of Report ---

## 2025-02-10 NOTE — DISCHARGE NOTE PROVIDER - NSDCMRMEDTOKEN_GEN_ALL_CORE_FT
amLODIPine 5 mg oral tablet: 1 tab(s) orally once a day  aspirin 81 mg oral tablet, chewable: 1 tab(s) chewed once a day  benztropine 1 mg oral tablet: 1 tab(s) orally once a day  bisacodyl 5 mg oral delayed release tablet: 1 tab(s) orally once a day  Gemtesa 75 mg oral tablet: 1 tab(s) orally once a day  Ibsrela 50 mg oral tablet: 1 tab(s) orally once a day  Linzess 72 mcg oral capsule: 1 cap(s) orally once a day  lisinopril 20 mg oral tablet: 1 tab(s) orally once a day  metFORMIN 500 mg oral tablet: 1 tab(s) orally 2 times a day  omeprazole 20 mg oral delayed release capsule: 1 cap(s) orally once a day  paliperidone 6 mg oral tablet, extended release: 1 tab(s) orally once a day  rosuvastatin 10 mg oral capsule: 1 cap(s) orally once a day  tamsulosin 0.4 mg oral capsule: 1 cap(s) orally once a day  ZyPREXA 5 mg oral tablet: 1 tab(s) orally once a day   amLODIPine 5 mg oral tablet: 1 tab(s) orally once a day  benztropine 1 mg oral tablet: 1 tab(s) orally once a day  bisacodyl 5 mg oral delayed release tablet: 1 tab(s) orally once a day  Gemtesa 75 mg oral tablet: 1 tab(s) orally once a day  Ibsrela 50 mg oral tablet: 1 tab(s) orally once a day  Linzess 72 mcg oral capsule: 1 cap(s) orally once a day  lisinopril 20 mg oral tablet: 1 tab(s) orally once a day  metFORMIN 500 mg oral tablet: 1 tab(s) orally 2 times a day  omeprazole 20 mg oral delayed release capsule: 1 cap(s) orally once a day  ondansetron 4 mg oral tablet: 1 tab(s) orally every 8 hours as needed for nausea or vomiting  paliperidone 6 mg oral tablet, extended release: 1 tab(s) orally once a day  rosuvastatin 10 mg oral capsule: 1 cap(s) orally once a day  tamsulosin 0.4 mg oral capsule: 1 cap(s) orally once a day  ZyPREXA 5 mg oral tablet: 1 tab(s) orally once a day

## 2025-02-10 NOTE — CONSULT NOTE ADULT - PROBLEM SELECTOR RECOMMENDATION 9
See above  Gastric cancer previously diagnosed in Aug 2024, also with history of separate incidental adenocarcinoma found at time of appendectomy the month prior  Patient known to Dr. Mcmanus at [facility].  Per my discussion with Dr. Mcmanus, patient would still be a candidate for neoadjuvant chemo, followed by Surgery.    See [facility] discussion above--patient and family remain clear that he does not want cancer based treatment at this time.  Patient is presently ECOG 2, and clinically he has stage III disease--locally advanced with progressive adenopathy, but no evidence of distant mets at this time.  While he is at increased risk of further morbidity and recurrent hospitalizations, he does not appear to be medially appropriate for hospice at this time (prognosis is > 6 months).  Patient also does not want hospice at this time; family is aware that he is low threshold for hospice in the near future.    Continue supportive care.

## 2025-02-10 NOTE — CONSULT NOTE ADULT - CONVERSATION DETAILS
Face to face meeting held with patient at bedside x 25 minutes (as separately identifiable service) to discuss prognosis, ACP, goals of care, and treatment preferences.   Discussion facilitated with use of Congolese speaking  as noted above.  Patient engaged in conversation voluntarily.  Supportive role of palliative care team explained.  Current hospital course and anticipated disease trajectory of patient’s anemia and gastric cancer discussed with patient in detail.     Patient is well aware of his diagnosis.  He remains clear that he does not wish to pursue chemotherapy or any other cancer directed treatment, even though (functionally) he is a good candidate for same.  He is aware that without cancer treatment, his body will continue to weaken over time and that he will eventually die--he stated that "I am 80 years old and already have lived a good life."  Counseled patient that he is likely to experience recurrent episodes of anemia over time, with need for recurrent hospital admissions.  Briefly introduced concept of hospice, but patient stated that he does not want to consider hospice at this time, wishes to continue active monitoring of his anemia, including iron and blood transfusions as medically necessary.      Advanced directives briefly reviewed.  Patient is clear that he does not want to be resuscitated or intubated.  He still wishes to be hospitalized if necessary in the future.  MOLST orders created for DNR/DNI and placed in the medical record.    Also spoke with daughter Marbella by phone, who verified that patient does not want cancer treatment, but also is not ready for comfort care or hospice at this time.  Advised daughter that patient may become hospice eligible in the near future; she voiced understanding of same.    Patient and daughter were appreciative of their respective conversations, and all of their questions were answered.

## 2025-02-10 NOTE — DISCHARGE NOTE NURSING/CASE MANAGEMENT/SOCIAL WORK - FINANCIAL ASSISTANCE
[facility] provides services at a reduced cost to those who are determined to be eligible through [facility]’s financial assistance program. Information regarding [facility]’s financial assistance program can be found by going to [email or web address] or by calling [phone].

## 2025-02-10 NOTE — CONSULT NOTE ADULT - ASSESSMENT
80-year-old Mosotho-speaking man from home, walks with assistance/needs walker/wheelchair, with hypertension, IBS, hyperlipidemia, anxiety/depression, GERD, diabetes, BPH, hx prostate cancer, and metastatic gastric adenocarcinoma diagnosed in August 2024 coming for a week of nausea, generalized weakness, decreased p.o. intake, and palpitations. He is admitted for symptomatic anemia likely in the setting of chronic disease and malnutrition.

## 2025-02-10 NOTE — CONSULT NOTE ADULT - SUBJECTIVE AND OBJECTIVE BOX
PULMONARY CONSULT NOTE      ISHMAEL SAMUEL  MRN-[id number]    History of Present Illness:  Reason for Admission: anemia requiring transfusion, FTT ISO metastatic cancer  History of Present Illness:   80-year-old Bahraini-speaking man from home, walks with assistance/needs walker/wheelchair, with hypertension, IBS, hyperlipidemia, anxiety/depression, GERD, diabetes, BPH, hx prostate cancer, and metastatic gastric adenocarcinoma diagnosed in August 2024 coming for a week of nausea, generalized weakness, decreased p.o. intake, and palpitations. He has lost about 16 pounds in 2 months. He was most recently seen by A in October 2024 and decided to opt out of treatment for adenocarcinoma due to advanced age. On examination he is AO4 but weak and pale. He denies any hematemesis, melena, hematuria, or nosebleeds. Hemoglobin was noted to be 5.1.  Per paperwork family brought in earlier, he is DNI/DNR and does not want artificial nutrition/hydration/antibiotics once he has reached a terminal state.  Today, however, they would like all medical intervention except for intubation and compressions.        HISTORY OF PRESENT ILLNESS: As Above. Awake, alert, laying in bed in NAD. Currently receiving PRBCs    MEDICATIONS  (STANDING):  amLODIPine   Tablet 5 milliGRAM(s) Oral daily  benztropine 1 milliGRAM(s) Oral daily  bisacodyl 5 milliGRAM(s) Oral at bedtime  dronabinol 2.5 milliGRAM(s) Oral two times a day  influenza  Vaccine (HIGH DOSE) 0.5 milliLiter(s) IntraMuscular once  insulin lispro (ADMELOG) corrective regimen sliding scale   SubCutaneous three times a day before meals  insulin lispro (ADMELOG) corrective regimen sliding scale   SubCutaneous at bedtime  lactated ringers. 1000 milliLiter(s) (75 mL/Hr) IV Continuous <Continuous>  lisinopril 20 milliGRAM(s) Oral daily  OLANZapine 5 milliGRAM(s) Oral daily  paliperidone ER. 6 milliGRAM(s) Oral at bedtime  pantoprazole    Tablet 40 milliGRAM(s) Oral before breakfast  rosuvastatin 10 milliGRAM(s) Oral at bedtime  tamsulosin 0.4 milliGRAM(s) Oral at bedtime      MEDICATIONS  (PRN):  aluminum hydroxide/magnesium hydroxide/simethicone Suspension 30 milliLiter(s) Oral every 4 hours PRN Dyspepsia  melatonin 3 milliGRAM(s) Oral at bedtime PRN Insomnia  ondansetron Injectable 4 milliGRAM(s) IV Push every 8 hours PRN Nausea and/or Vomiting      Allergies    No Known Allergies    Intolerances        PAST MEDICAL & SURGICAL HISTORY:  HTN (hypertension)      HLD (hyperlipidemia)      Liver cirrhosis      Adenoma      Gastric reflux      DM (diabetes mellitus)      Hepatitis C      History of appendectomy      H/O cataract extraction          FAMILY HISTORY:      SOCIAL HISTORY  Smoking History:     REVIEW OF SYSTEMS:    CONSTITUTIONAL:  No fevers, chills, sweats    HEENT:  Eyes:  No diplopia or blurred vision. ENT:  No earache, sore throat or runny nose.    CARDIOVASCULAR:  No pressure, squeezing, tightness, or heaviness about the chest; no palpitations.    RESPIRATORY:  Per HPI    GASTROINTESTINAL:  No abdominal pain, nausea, vomiting or diarrhea.    GENITOURINARY:  No dysuria, frequency or urgency.    NEUROLOGIC:  No paresthesias, fasciculations, seizures or weakness.    PSYCHIATRIC:  No disorder of thought or mood.    Vital Signs Last 24 Hrs  T(C): 36.3 (10 Feb 2025 08:00), Max: 36.9 (09 Feb 2025 16:27)  T(F): 97.3 (10 Feb 2025 08:00), Max: 98.5 (09 Feb 2025 16:27)  HR: 65 (10 Feb 2025 08:00) (65 - 86)  BP: 117/66 (10 Feb 2025 08:00) (110/60 - 138/68)  BP(mean): 74 (09 Feb 2025 19:41) (74 - 74)  RR: 18 (10 Feb 2025 08:00) (17 - 26)  SpO2: 96% (10 Feb 2025 08:00) (96% - 100%)    Parameters below as of 10 Feb 2025 08:00  Patient On (Oxygen Delivery Method): room air      I&O's Detail      PHYSICAL EXAMINATION:    GENERAL: The patient is a well-developed, well-nourished _____in no apparent distress.     HEENT: Head is normocephalic and atraumatic. Extraocular muscles are intact. Mucous membranes are moist.     NECK: Supple.     LUNGS: Clear to auscultation without wheezing, rales, or rhonchi. Respirations unlabored    HEART: Regular rate and rhythm without murmur.    ABDOMEN: Soft, nontender, and nondistended.  No hepatosplenomegaly is noted.    EXTREMITIES: Without any cyanosis, clubbing, rash, lesions or edema.    NEUROLOGIC: Grossly intact.      LABS:                        7.6    6.66  )-----------( 247      ( 10 Feb 2025 05:30 )             23.0     02-10    139  |  107  |  12  ----------------------------<  120[H]  4.4   |  25  |  0.70    Ca    8.8      10 Feb 2025 05:30  Phos  2.4     02-09  Mg     1.9     02-09    TPro  5.8[L]  /  Alb  3.1[L]  /  TBili  0.7  /  DBili  x   /  AST  20  /  ALT  18  /  AlkPhos  30[L]  02-10    PT/INR - ( 09 Feb 2025 13:40 )   PT: 11.1 sec;   INR: 0.96 ratio         PTT - ( 09 Feb 2025 13:40 )  PTT:26.6 sec  Urinalysis Basic - ( 10 Feb 2025 05:30 )    Color: x / Appearance: x / SG: x / pH: x  Gluc: 120 mg/dL / Ketone: x  / Bili: x / Urobili: x   Blood: x / Protein: x / Nitrite: x   Leuk Esterase: x / RBC: x / WBC x   Sq Epi: x / Non Sq Epi: x / Bacteria: x                      MICROBIOLOGY:    RADIOLOGY & ADDITIONAL STUDIES:    CXR:  < from: CT Abdomen and Pelvis w/ IV Cont (02.09.25 @ 16:01) >  IMPRESSION:    No pulmonary embolism.    A few new subcentimeter lung nodules due to airway disease or metastasis.   Recommend short-term follow-up in 3 months.    Gastric wall thickening. A new subcentimeter perigastric lymph node.    No acute abnormality in the abdomen and pelvis.      < end of copied text >    Ct scan chest;    ekg;    echo:

## 2025-02-10 NOTE — CONSULT NOTE ADULT - PROBLEM SELECTOR RECOMMENDATION 3
likely secondary to anemia  oxygen supp  monitor oxygen sat  Bronchodilators prn  PFTs as OP
Patient reports appetite improved after transfusions  Currently has BMI of 30 with albumin of 3.1, may be at risk of emerging protein-calorie malnutrition    For now agree with trial of dronabinol 2.5 mg BID    Please discharge home on oral Zofran  4 mg Q 6 hrs PRN for nausea

## 2025-02-10 NOTE — DISCHARGE NOTE PROVIDER - HOSPITAL COURSE
80-year-old Lao-speaking man from home, walks with assistance/needs walker/wheelchair, with hypertension, IBS, hyperlipidemia, anxiety/depression, GERD, diabetes, BPH, hx prostate cancer, and metastatic gastric adenocarcinoma diagnosed in August 2024 coming for a week of nausea, generalized weakness, decreased p.o. intake, and palpitations. He has lost about 16 pounds in 2 months. He was most recently seen by A in October 2024 and decided to opt out of treatment for adenocarcinoma due to advanced age. On examination he is AO4 but weak and pale. He denies any hematemesis, melena, hematuria, or nosebleeds. Hemoglobin was noted to be 5.1.  Per paperwork family brought in earlier, he is DNI/DNR and does not want artificial nutrition/hydration/antibiotics once he has reached a terminal state.  Today, however, they would like all medical intervention except for intubation and compressions.  He was  admitted for symptomatic anemia with H/H 5.1/16.5 likely in the setting of chronic disease and malnutrition.   Pt received 3 units of PRBC,  responding well with intervention  Discussed with atttending, pt is to dc home and f/u with PCP.        80-year-old Solomon Islander-speaking man from home, walks with assistance/needs walker/wheelchair, with hypertension, IBS, hyperlipidemia, anxiety/depression, GERD, diabetes, BPH, hx prostate cancer, and metastatic gastric adenocarcinoma diagnosed in August 2024 coming for a week of nausea, generalized weakness, decreased p.o. intake, and palpitations. He has lost about 16 pounds in 2 months. He was most recently seen by [facility] in October 2024 and decided to opt out of treatment for adenocarcinoma due to advanced age. On examination he is AO4 but weak and pale. He denies any hematemesis, melena, hematuria, or nosebleeds. Hemoglobin was noted to be 5.1.  Per paperwork family brought in earlier, he is DNI/DNR and does not want artificial nutrition/hydration/antibiotics once he has reached a terminal state.  Today, however, they would like all medical intervention except for intubation and compressions.  He was  admitted for symptomatic anemia with H/H 5.1/16.5 likely in the setting of chronic disease and malnutrition.  Seen by Physical Therapy and Palliative.    Pt received 3 units of PRBC,  responding well with intervention  Discussed with attending pt is to dc home and f/u with PCP.        80-year-old Kyrgyz-speaking man from home, walks with assistance/needs walker/wheelchair, with hypertension, IBS, hyperlipidemia, anxiety/depression, GERD, diabetes, BPH, hx prostate cancer, and metastatic gastric adenocarcinoma diagnosed in August 2024 coming for a week of nausea, generalized weakness, decreased p.o. intake, and palpitations. He has lost about 16 pounds in 2 months. He was most recently seen by A in October 2024 and decided to opt out of treatment for adenocarcinoma due to advanced age. On examination he is AO4 but weak and pale. He denies any hematemesis, melena, hematuria, or nosebleeds. Hemoglobin was noted to be 5.1.  Per paperwork family brought in earlier, he is DNI/DNR and does not want artificial nutrition/hydration/antibiotics once he has reached a terminal state.  Today, however, they would like all medical intervention except for intubation and compressions.  He was  admitted for symptomatic anemia with H/H 5.1/16.5 likely in the setting of chronic disease and malnutrition.  Seen by Physical Therapy and Palliative.    Pt received 3 units of PRBCs,  responding well with intervention  Discussed with attending pt is to dc home and f/u with PCP.

## 2025-02-10 NOTE — CONSULT NOTE ADULT - PROBLEM SELECTOR RECOMMENDATION 5
check PSA   follow up as OP
Patient with stage III (locally advanced) gastric cancer, no distant mets, not eligible for hospice at this time, low threshold for hospice in the future.    See [facility] discussion above--patient/family also not ready for hospice or comfort care at this time, wish to continue with active management/monitoring of anemia, including future transfusions and hospitalization as necessary.    Otherwise continue management as per primary medical team  MOLST orders created for DNR/DNI  Discussed with medical team and Dr. Pace in detail    No additional/acute palliative care needs identified at this time  Palliative Care team will sign off.  Please reconsult PRN

## 2025-02-10 NOTE — PHYSICAL THERAPY INITIAL EVALUATION ADULT - PERTINENT HX OF CURRENT PROBLEM, REHAB EVAL
80-year-old Cape Verdean-speaking man from home, walks with assistance/needs walker/wheelchair, with hypertension, IBS, hyperlipidemia, anxiety/depression, GERD, diabetes, BPH, hx prostate cancer, and metastatic gastric adenocarcinoma diagnosed in August 2024 coming for a week of nausea, generalized weakness, decreased p.o. intake, and palpitations. pt admitted to [facility] for anemia requiring transfusion, FTT ISO metastatic CA.

## 2025-02-10 NOTE — PROGRESS NOTE ADULT - SUBJECTIVE AND OBJECTIVE BOX
Patient is a 80y old  Male who presents with a chief complaint of anemia requiring transfusion, FTT ISO metastatic cancer (09 Feb 2025 17:13)    pt seen in icu [  ], reg med floor [   ], bed [  ], chair at bedside [   ], a+o x3 [  ], lethargic [  ],  nad [  ]    chandler [  ], ngt [  ], peg [  ], et tube [  ], cent line [  ], picc line [  ]        Allergies    No Known Allergies        Vitals    T(F): 98.2 (02-10-25 @ 04:48), Max: 98.5 (02-09-25 @ 16:27)  HR: 75 (02-10-25 @ 04:48) (66 - 86)  BP: 118/70 (02-10-25 @ 04:48) (110/60 - 138/68)  RR: 18 (02-10-25 @ 04:48) (17 - 26)  SpO2: 97% (02-10-25 @ 04:48) (96% - 100%)  Wt(kg): --  CAPILLARY BLOOD GLUCOSE      POCT Blood Glucose.: 138 mg/dL (09 Feb 2025 22:13)      Labs                          7.6    6.66  )-----------( 247      ( 10 Feb 2025 05:30 )             23.0       02-10    139  |  107  |  12  ----------------------------<  120[H]  4.4   |  25  |  0.70    Ca    8.8      10 Feb 2025 05:30  Phos  2.4     02-09  Mg     1.9     02-09    TPro  5.8[L]  /  Alb  3.1[L]  /  TBili  0.7  /  DBili  x   /  AST  20  /  ALT  18  /  AlkPhos  30[L]  02-10          Troponin I, High Sensitivity Result: 37.6 ng/L (02-09-25 @ 15:30)  Troponin I, High Sensitivity Result: 33.5 ng/L (02-09-25 @ 13:40)        Radiology Results      Meds    MEDICATIONS  (STANDING):  amLODIPine   Tablet 5 milliGRAM(s) Oral daily  benztropine 1 milliGRAM(s) Oral daily  bisacodyl 5 milliGRAM(s) Oral at bedtime  dronabinol 2.5 milliGRAM(s) Oral two times a day  influenza  Vaccine (HIGH DOSE) 0.5 milliLiter(s) IntraMuscular once  insulin lispro (ADMELOG) corrective regimen sliding scale   SubCutaneous three times a day before meals  insulin lispro (ADMELOG) corrective regimen sliding scale   SubCutaneous at bedtime  lactated ringers. 1000 milliLiter(s) (75 mL/Hr) IV Continuous <Continuous>  lisinopril 20 milliGRAM(s) Oral daily  OLANZapine 5 milliGRAM(s) Oral daily  paliperidone ER. 6 milliGRAM(s) Oral at bedtime  pantoprazole    Tablet 40 milliGRAM(s) Oral before breakfast  rosuvastatin 10 milliGRAM(s) Oral at bedtime  tamsulosin 0.4 milliGRAM(s) Oral at bedtime      MEDICATIONS  (PRN):  aluminum hydroxide/magnesium hydroxide/simethicone Suspension 30 milliLiter(s) Oral every 4 hours PRN Dyspepsia  melatonin 3 milliGRAM(s) Oral at bedtime PRN Insomnia  ondansetron Injectable 4 milliGRAM(s) IV Push every 8 hours PRN Nausea and/or Vomiting      Physical Exam    Neuro :  no focal deficits  Respiratory: CTA B/L  CV: RRR, S1S2, no murmurs,   Abdominal: Soft, NT, ND +BS,  Extremities: No edema, + peripheral pulses    ASSESSMENT    severe anemia likely 2nd to metastatic disease,   h/o hypertension,   IBS,   hyperlipidemia,   anxiety/depression,   GERD,   diabetes,   BPH, prostate cancer, and metastatic gastric adenocarcinoma    Shortness of breath    DNI    HTN (hypertension)    HLD (hyperlipidemia)    Liver cirrhosis    Adenoma    Gastric reflux    DM (diabetes mellitus)    Hepatitis C    No significant past surgical history    History of appendectomy    H/O cataract extraction        PLAN    admit to med,   transfuse 2units prbc,   pro ss,   cont preadmit home meds,   gi and dvt prophylaxis  cbc,   bmp,   mg,   phos,   lipids,   tsh,   hgba1c       palliative cons .     Patient is a 80y old  Male who presents with a chief complaint of anemia requiring transfusion, FTT ISO metastatic cancer (09 Feb 2025 17:13)    pt seen in icu [  ], reg med floor [ x  ], bed [ x ], chair at bedside [   ], a+o x3 [ x ], lethargic [  ],  nad [ x ]      Allergies    No Known Allergies        Vitals    T(F): 98.2 (02-10-25 @ 04:48), Max: 98.5 (02-09-25 @ 16:27)  HR: 75 (02-10-25 @ 04:48) (66 - 86)  BP: 118/70 (02-10-25 @ 04:48) (110/60 - 138/68)  RR: 18 (02-10-25 @ 04:48) (17 - 26)  SpO2: 97% (02-10-25 @ 04:48) (96% - 100%)  Wt(kg): --  CAPILLARY BLOOD GLUCOSE      POCT Blood Glucose.: 138 mg/dL (09 Feb 2025 22:13)      Labs                          7.6    6.66  )-----------( 247      ( 10 Feb 2025 05:30 )             23.0       02-10    139  |  107  |  12  ----------------------------<  120[H]  4.4   |  25  |  0.70    Ca    8.8      10 Feb 2025 05:30  Phos  2.4     02-09  Mg     1.9     02-09    TPro  5.8[L]  /  Alb  3.1[L]  /  TBili  0.7  /  DBili  x   /  AST  20  /  ALT  18  /  AlkPhos  30[L]  02-10          Troponin I, High Sensitivity Result: 37.6 ng/L (02-09-25 @ 15:30)  Troponin I, High Sensitivity Result: 33.5 ng/L (02-09-25 @ 13:40)        Radiology Results      Meds    MEDICATIONS  (STANDING):  amLODIPine   Tablet 5 milliGRAM(s) Oral daily  benztropine 1 milliGRAM(s) Oral daily  bisacodyl 5 milliGRAM(s) Oral at bedtime  dronabinol 2.5 milliGRAM(s) Oral two times a day  influenza  Vaccine (HIGH DOSE) 0.5 milliLiter(s) IntraMuscular once  insulin lispro (ADMELOG) corrective regimen sliding scale   SubCutaneous three times a day before meals  insulin lispro (ADMELOG) corrective regimen sliding scale   SubCutaneous at bedtime  lactated ringers. 1000 milliLiter(s) (75 mL/Hr) IV Continuous <Continuous>  lisinopril 20 milliGRAM(s) Oral daily  OLANZapine 5 milliGRAM(s) Oral daily  paliperidone ER. 6 milliGRAM(s) Oral at bedtime  pantoprazole    Tablet 40 milliGRAM(s) Oral before breakfast  rosuvastatin 10 milliGRAM(s) Oral at bedtime  tamsulosin 0.4 milliGRAM(s) Oral at bedtime      MEDICATIONS  (PRN):  aluminum hydroxide/magnesium hydroxide/simethicone Suspension 30 milliLiter(s) Oral every 4 hours PRN Dyspepsia  melatonin 3 milliGRAM(s) Oral at bedtime PRN Insomnia  ondansetron Injectable 4 milliGRAM(s) IV Push every 8 hours PRN Nausea and/or Vomiting      Physical Exam    Neuro :  no focal deficits  Respiratory: CTA B/L  CV: RRR, S1S2, no murmurs,   Abdominal: Soft, NT, ND +BS,  Extremities: No edema, + peripheral pulses      ASSESSMENT    severe anemia likely 2nd to metastatic disease,   h/o hypertension,   IBS,   hyperlipidemia,   anxiety/depression,   GERD,   diabetes,   BPH,   prostate cancer,   metastatic gastric adenocarcinoma  DNI  Liver cirrhosis        PLAN    s/p transfuse 2units prbc,   transfuse 1 more unit prbc  lisprpro ss,   hgba1c   palliative cons .  cont current meds    Patient is a 80y old  Male who presents with a chief complaint of anemia requiring transfusion, FTT ISO metastatic cancer (09 Feb 2025 17:13)    pt seen in icu [  ], reg med floor [ x  ], bed [ x ], chair at bedside [   ], a+o x3 [ x ], lethargic [  ],  nad [ x ]      Allergies    No Known Allergies        Vitals    T(F): 98.2 (02-10-25 @ 04:48), Max: 98.5 (02-09-25 @ 16:27)  HR: 75 (02-10-25 @ 04:48) (66 - 86)  BP: 118/70 (02-10-25 @ 04:48) (110/60 - 138/68)  RR: 18 (02-10-25 @ 04:48) (17 - 26)  SpO2: 97% (02-10-25 @ 04:48) (96% - 100%)  Wt(kg): --  CAPILLARY BLOOD GLUCOSE      POCT Blood Glucose.: 138 mg/dL (09 Feb 2025 22:13)      Labs                          7.6    6.66  )-----------( 247      ( 10 Feb 2025 05:30 )             23.0       02-10    139  |  107  |  12  ----------------------------<  120[H]  4.4   |  25  |  0.70    Ca    8.8      10 Feb 2025 05:30  Phos  2.4     02-09  Mg     1.9     02-09    TPro  5.8[L]  /  Alb  3.1[L]  /  TBili  0.7  /  DBili  x   /  AST  20  /  ALT  18  /  AlkPhos  30[L]  02-10          Troponin I, High Sensitivity Result: 37.6 ng/L (02-09-25 @ 15:30)  Troponin I, High Sensitivity Result: 33.5 ng/L (02-09-25 @ 13:40)        Radiology Results      Meds    MEDICATIONS  (STANDING):  amLODIPine   Tablet 5 milliGRAM(s) Oral daily  benztropine 1 milliGRAM(s) Oral daily  bisacodyl 5 milliGRAM(s) Oral at bedtime  dronabinol 2.5 milliGRAM(s) Oral two times a day  influenza  Vaccine (HIGH DOSE) 0.5 milliLiter(s) IntraMuscular once  insulin lispro (ADMELOG) corrective regimen sliding scale   SubCutaneous three times a day before meals  insulin lispro (ADMELOG) corrective regimen sliding scale   SubCutaneous at bedtime  lactated ringers. 1000 milliLiter(s) (75 mL/Hr) IV Continuous <Continuous>  lisinopril 20 milliGRAM(s) Oral daily  OLANZapine 5 milliGRAM(s) Oral daily  paliperidone ER. 6 milliGRAM(s) Oral at bedtime  pantoprazole    Tablet 40 milliGRAM(s) Oral before breakfast  rosuvastatin 10 milliGRAM(s) Oral at bedtime  tamsulosin 0.4 milliGRAM(s) Oral at bedtime      MEDICATIONS  (PRN):  aluminum hydroxide/magnesium hydroxide/simethicone Suspension 30 milliLiter(s) Oral every 4 hours PRN Dyspepsia  melatonin 3 milliGRAM(s) Oral at bedtime PRN Insomnia  ondansetron Injectable 4 milliGRAM(s) IV Push every 8 hours PRN Nausea and/or Vomiting      Physical Exam    Neuro :  no focal deficits  Respiratory: CTA B/L  CV: RRR, S1S2, no murmurs,   Abdominal: Soft, NT, ND +BS,  Extremities: No edema, + peripheral pulses      ASSESSMENT    severe anemia likely 2nd to metastatic disease,   h/o hypertension,   IBS,   hyperlipidemia,   anxiety/depression,   GERD,   diabetes,   BPH,   prostate cancer,   metastatic gastric adenocarcinoma  DNI  Liver cirrhosis        PLAN    s/p transfuse 2units prbc,   transfuse 1 more unit prbc  lisprpro ss,   hgba1c   palliative cons .  cont current meds    d/c plan after blood transfusion

## 2025-02-10 NOTE — CONSULT NOTE ADULT - PROBLEM SELECTOR RECOMMENDATION 4
likely metastatic disease  Quantiferon TB Gold test  Nodify testing as OP
Clinically stable  Continue home olanzapine and paliperidone

## 2025-02-10 NOTE — DISCHARGE NOTE PROVIDER - CARE PROVIDER_API CALL
Kaitlynn Sanders  [facility]  [address] [place] [address]  Phone: [phone]  Fax: [phone]  Follow Up Time: 1-3 days

## 2025-02-10 NOTE — CONSULT NOTE ADULT - REASON FOR ADMISSION
anemia requiring transfusion, FTT ISO metastatic cancer
anemia requiring transfusion, FTT ISO metastatic cancer

## 2025-02-10 NOTE — CONSULT NOTE ADULT - PROBLEM SELECTOR RECOMMENDATION 2
Hem/Onc follow up
Likely related to underlying anemia  Clinically improved following 3 units PRBCs    Remainder of management as per primary medical team

## 2025-02-10 NOTE — DISCHARGE NOTE PROVIDER - NSDCCPCAREPLAN_GEN_ALL_CORE_FT
PRINCIPAL DISCHARGE DIAGNOSIS  Diagnosis: Symptomatic anemia  Assessment and Plan of Treatment: likely due to underlying diseases   - prostate cancer, metastatic gastric adenocarcinom and Liver cirrhosis.   You received 3 units of Packed RBC transfusions, when you came in your Hemoglobin and hemtocrit were 5.1/16.5   this morning prior to last transfusion, your Hemoglobin / Hematocrit were 7.6/25.6 - it is closed to your latest H/H in July/2024 with us   follow up with PCP  for routine blood work

## 2025-02-10 NOTE — CONSULT NOTE ADULT - TIME BILLING
Chart review (including review of imaging and test results), examination of patient,  collaboration with Heme/Onc and primary medical team, and documentation in the medical record.    Total encounter time is EXCLUSIVE of time spent on ACP discussion

## 2025-02-10 NOTE — CONSULT NOTE ADULT - CONSULT REASON
SOB
Complex medical decision making in the context of metastatic gastric cancer, symptomatic anemia, and FTT

## 2025-02-10 NOTE — DISCHARGE NOTE NURSING/CASE MANAGEMENT/SOCIAL WORK - PATIENT PORTAL LINK FT
You can access the FollowMyHealth Patient Portal offered by [facility] by registering at the following website: [email or web address]. By joining [employer]’s FollowMyHealth portal, you will also be able to view your health information using other applications (apps) compatible with our system.

## 2025-02-10 NOTE — CHART NOTE - NSCHARTNOTEFT_GEN_A_CORE
The pt will use a walker in the home and outside daily.  A walker will provide greater stability and safe ambulation to complete MRADLs.

## 2025-02-24 ENCOUNTER — EMERGENCY (EMERGENCY)
Facility: HOSPITAL | Age: 80
LOS: 1 days | Discharge: HOME | End: 2025-02-24
Attending: EMERGENCY MEDICINE
Payer: MEDICARE

## 2025-02-24 VITALS
HEIGHT: 64 IN | TEMPERATURE: 98 F | RESPIRATION RATE: 16 BRPM | WEIGHT: 175.05 LBS | HEART RATE: 70 BPM | SYSTOLIC BLOOD PRESSURE: 122 MMHG | DIASTOLIC BLOOD PRESSURE: 75 MMHG | OXYGEN SATURATION: 99 %

## 2025-02-24 VITALS
SYSTOLIC BLOOD PRESSURE: 101 MMHG | TEMPERATURE: 98 F | OXYGEN SATURATION: 99 % | DIASTOLIC BLOOD PRESSURE: 62 MMHG | RESPIRATION RATE: 20 BRPM | HEART RATE: 70 BPM

## 2025-02-24 DIAGNOSIS — Z90.49 ACQUIRED ABSENCE OF OTHER SPECIFIED PARTS OF DIGESTIVE TRACT: Chronic | ICD-10-CM

## 2025-02-24 DIAGNOSIS — Z51.5 ENCOUNTER FOR PALLIATIVE CARE: ICD-10-CM

## 2025-02-24 DIAGNOSIS — Z98.49 CATARACT EXTRACTION STATUS, UNSPECIFIED EYE: Chronic | ICD-10-CM

## 2025-02-24 DIAGNOSIS — C16.9 MALIGNANT NEOPLASM OF STOMACH, UNSPECIFIED: ICD-10-CM

## 2025-02-24 DIAGNOSIS — Z75.8 OTHER PROBLEMS RELATED TO MEDICAL FACILITIES AND OTHER HEALTH CARE: ICD-10-CM

## 2025-02-24 LAB
ALBUMIN SERPL ELPH-MCNC: 3.5 G/DL — SIGNIFICANT CHANGE UP (ref 3.5–5)
ALP SERPL-CCNC: 32 U/L — LOW (ref 40–120)
ALT FLD-CCNC: 18 U/L DA — SIGNIFICANT CHANGE UP (ref 10–60)
ANION GAP SERPL CALC-SCNC: 10 MMOL/L — SIGNIFICANT CHANGE UP (ref 5–17)
APTT BLD: 27 SEC — SIGNIFICANT CHANGE UP (ref 24.5–35.6)
AST SERPL-CCNC: 11 U/L — SIGNIFICANT CHANGE UP (ref 10–40)
BASOPHILS # BLD AUTO: 0.03 K/UL — SIGNIFICANT CHANGE UP (ref 0–0.2)
BASOPHILS NFR BLD AUTO: 0.5 % — SIGNIFICANT CHANGE UP (ref 0–2)
BILIRUB SERPL-MCNC: 0.2 MG/DL — SIGNIFICANT CHANGE UP (ref 0.2–1.2)
BUN SERPL-MCNC: 16 MG/DL — SIGNIFICANT CHANGE UP (ref 7–18)
CALCIUM SERPL-MCNC: 8.9 MG/DL — SIGNIFICANT CHANGE UP (ref 8.4–10.5)
CHLORIDE SERPL-SCNC: 101 MMOL/L — SIGNIFICANT CHANGE UP (ref 96–108)
CO2 SERPL-SCNC: 23 MMOL/L — SIGNIFICANT CHANGE UP (ref 22–31)
CREAT SERPL-MCNC: 0.95 MG/DL — SIGNIFICANT CHANGE UP (ref 0.5–1.3)
EGFR: 80 ML/MIN/1.73M2 — SIGNIFICANT CHANGE UP
EOSINOPHIL # BLD AUTO: 0.01 K/UL — SIGNIFICANT CHANGE UP (ref 0–0.5)
EOSINOPHIL NFR BLD AUTO: 0.2 % — SIGNIFICANT CHANGE UP (ref 0–6)
GLUCOSE SERPL-MCNC: 114 MG/DL — HIGH (ref 70–99)
HCT VFR BLD CALC: 22.8 % — LOW (ref 39–50)
HGB BLD-MCNC: 7.5 G/DL — LOW (ref 13–17)
IMM GRANULOCYTES NFR BLD AUTO: 0.3 % — SIGNIFICANT CHANGE UP (ref 0–0.9)
INR BLD: 1 RATIO — SIGNIFICANT CHANGE UP (ref 0.85–1.16)
IRON SATN MFR SERPL: 21 UG/DL — LOW (ref 65–170)
IRON SATN MFR SERPL: 6 % — LOW (ref 20–55)
LDH SERPL L TO P-CCNC: 244 U/L — HIGH (ref 120–225)
LYMPHOCYTES # BLD AUTO: 1.66 K/UL — SIGNIFICANT CHANGE UP (ref 1–3.3)
LYMPHOCYTES # BLD AUTO: 28.3 % — SIGNIFICANT CHANGE UP (ref 13–44)
MCHC RBC-ENTMCNC: 25.2 PG — LOW (ref 27–34)
MCHC RBC-ENTMCNC: 32.9 G/DL — SIGNIFICANT CHANGE UP (ref 32–36)
MCV RBC AUTO: 76.5 FL — LOW (ref 80–100)
MONOCYTES # BLD AUTO: 0.63 K/UL — SIGNIFICANT CHANGE UP (ref 0–0.9)
MONOCYTES NFR BLD AUTO: 10.7 % — SIGNIFICANT CHANGE UP (ref 2–14)
NEUTROPHILS # BLD AUTO: 3.52 K/UL — SIGNIFICANT CHANGE UP (ref 1.8–7.4)
NEUTROPHILS NFR BLD AUTO: 60 % — SIGNIFICANT CHANGE UP (ref 43–77)
NRBC BLD AUTO-RTO: 0 /100 WBCS — SIGNIFICANT CHANGE UP (ref 0–0)
PLATELET # BLD AUTO: 376 K/UL — SIGNIFICANT CHANGE UP (ref 150–400)
POTASSIUM SERPL-MCNC: 4.2 MMOL/L — SIGNIFICANT CHANGE UP (ref 3.5–5.3)
POTASSIUM SERPL-SCNC: 4.2 MMOL/L — SIGNIFICANT CHANGE UP (ref 3.5–5.3)
PROT SERPL-MCNC: 6.2 G/DL — SIGNIFICANT CHANGE UP (ref 6–8.3)
PROTHROM AB SERPL-ACNC: 11.7 SEC — SIGNIFICANT CHANGE UP (ref 9.9–13.4)
RBC # BLD: 2.69 M/UL — LOW (ref 4.2–5.8)
RBC # BLD: 2.98 M/UL — LOW (ref 4.2–5.8)
RBC # FLD: 19 % — HIGH (ref 10.3–14.5)
RETICS #: 42 K/UL — SIGNIFICANT CHANGE UP (ref 25–125)
RETICS/RBC NFR: 1.6 % — SIGNIFICANT CHANGE UP (ref 0.5–2.5)
SODIUM SERPL-SCNC: 134 MMOL/L — LOW (ref 135–145)
TIBC SERPL-MCNC: 337 UG/DL — SIGNIFICANT CHANGE UP (ref 250–450)
TROPONIN I, HIGH SENSITIVITY RESULT: 4.8 NG/L — SIGNIFICANT CHANGE UP
TSH SERPL-MCNC: 0.44 UU/ML — SIGNIFICANT CHANGE UP (ref 0.34–4.82)
UIBC SERPL-MCNC: 316 UG/DL — SIGNIFICANT CHANGE UP (ref 110–370)
WBC # BLD: 5.87 K/UL — SIGNIFICANT CHANGE UP (ref 3.8–10.5)
WBC # FLD AUTO: 5.87 K/UL — SIGNIFICANT CHANGE UP (ref 3.8–10.5)

## 2025-02-24 PROCEDURE — 96361 HYDRATE IV INFUSION ADD-ON: CPT

## 2025-02-24 PROCEDURE — T1013: CPT

## 2025-02-24 PROCEDURE — 83615 LACTATE (LD) (LDH) ENZYME: CPT

## 2025-02-24 PROCEDURE — 84443 ASSAY THYROID STIM HORMONE: CPT

## 2025-02-24 PROCEDURE — 83550 IRON BINDING TEST: CPT

## 2025-02-24 PROCEDURE — 82962 GLUCOSE BLOOD TEST: CPT

## 2025-02-24 PROCEDURE — 85730 THROMBOPLASTIN TIME PARTIAL: CPT

## 2025-02-24 PROCEDURE — 86900 BLOOD TYPING SEROLOGIC ABO: CPT

## 2025-02-24 PROCEDURE — 99285 EMERGENCY DEPT VISIT HI MDM: CPT | Mod: FS

## 2025-02-24 PROCEDURE — 85025 COMPLETE CBC W/AUTO DIFF WBC: CPT

## 2025-02-24 PROCEDURE — 83540 ASSAY OF IRON: CPT

## 2025-02-24 PROCEDURE — 86901 BLOOD TYPING SEROLOGIC RH(D): CPT

## 2025-02-24 PROCEDURE — 82728 ASSAY OF FERRITIN: CPT

## 2025-02-24 PROCEDURE — 99497 ADVNCD CARE PLAN 30 MIN: CPT

## 2025-02-24 PROCEDURE — 93010 ELECTROCARDIOGRAM REPORT: CPT

## 2025-02-24 PROCEDURE — 93005 ELECTROCARDIOGRAM TRACING: CPT

## 2025-02-24 PROCEDURE — 36415 COLL VENOUS BLD VENIPUNCTURE: CPT

## 2025-02-24 PROCEDURE — 99284 EMERGENCY DEPT VISIT MOD MDM: CPT | Mod: 25

## 2025-02-24 PROCEDURE — 82607 VITAMIN B-12: CPT

## 2025-02-24 PROCEDURE — 84484 ASSAY OF TROPONIN QUANT: CPT

## 2025-02-24 PROCEDURE — 85610 PROTHROMBIN TIME: CPT

## 2025-02-24 PROCEDURE — 85045 AUTOMATED RETICULOCYTE COUNT: CPT

## 2025-02-24 PROCEDURE — 96374 THER/PROPH/DIAG INJ IV PUSH: CPT

## 2025-02-24 PROCEDURE — 86850 RBC ANTIBODY SCREEN: CPT

## 2025-02-24 PROCEDURE — 80053 COMPREHEN METABOLIC PANEL: CPT

## 2025-02-24 PROCEDURE — 83010 ASSAY OF HAPTOGLOBIN QUANT: CPT

## 2025-02-24 PROCEDURE — 82746 ASSAY OF FOLIC ACID SERUM: CPT

## 2025-02-24 PROCEDURE — 99285 EMERGENCY DEPT VISIT HI MDM: CPT | Mod: 25

## 2025-02-24 RX ORDER — ONDANSETRON 4 MG/1
4 TABLET, ORALLY DISINTEGRATING ORAL ONCE
Refills: 0 | Status: COMPLETED | OUTPATIENT
Start: 2025-02-24 | End: 2025-02-24

## 2025-02-24 RX ORDER — BACTERIOSTATIC SODIUM CHLORIDE 0.9 %
500 VIAL (ML) INJECTION ONCE
Refills: 0 | Status: COMPLETED | OUTPATIENT
Start: 2025-02-24 | End: 2025-02-24

## 2025-02-24 RX ORDER — ONDANSETRON 4 MG/1
1 TABLET, ORALLY DISINTEGRATING ORAL
Qty: 2 | Refills: 0
Start: 2025-02-24 | End: 2025-03-01

## 2025-02-24 RX ADMIN — Medication 500 MILLILITER(S): at 16:27

## 2025-02-24 RX ADMIN — Medication 500 MILLILITER(S): at 14:17

## 2025-02-24 RX ADMIN — Medication 500 MILLILITER(S): at 15:15

## 2025-02-24 RX ADMIN — ONDANSETRON 4 MILLIGRAM(S): 4 TABLET, ORALLY DISINTEGRATING ORAL at 14:17

## 2025-02-24 RX ADMIN — Medication 500 MILLILITER(S): at 17:31

## 2025-02-24 NOTE — ED PROVIDER NOTE - PHYSICAL EXAMINATION
CONSTITUTIONAL: A&O x3, resting comfortably  HEAD: Normocephalic, atraumatic.   NECK:  Airway patent. Neck Supple.   CARDIAC: RRR, normal S1/2, no murmurs, rubs, gallops. CW non-TTP, no CW deformity.  RESPIRATORY: CTA B/L, good aeration. No wheezing, rales, adventitious breath sounds. No accessory muscle use.    ABDOMEN: soft, non-distended; non-TTP, No RUQ/RLQ/LUQ/LLQ pain, no rebound tenderness or guarding, BS + x4 quadrants, no pulsating masses, scars. No CVA tenderness.  PERIPHERAL VASCULAR: No edema of feet and ankles. No calf tenderness. Pulses 2+ B/L.   MSK: Moving all extremities.   SKIN: Warm, dry, color WNL, no turgor, erythema or rashes. Cap refill < 2 sec.  NEURO: A&Ox3, interactive, cooperative, no focal deficits. Following commands. Full ROM and Strength 5+/5+ B/L upper and lower extremities.

## 2025-02-24 NOTE — CONSULT NOTE ADULT - NS ATTEND AMEND GEN_ALL_CORE FT
CC: Gastric ca  INTERVAL HPI: Patient seen and examined at bedside; Events noted; Patient c/o weakness    PMH/PSH as above; non-contributory    FmHx/Sox Hx as above; non-contributory    ROS as above; pt is not able to provide detailed review of systems  General: Non-contributory; Skin/Breast: NC; Ophthalmologic:NC; ENMT: NC; Respiratory and Thorax: NC; Cardiovascular: NC; 	  Gastrointestinal:NC; Genitourinary:NC; Musculoskeletal:NC; Neurological: NC; Psychiatric: NC; Hematology/Lymphatics: NC; Endocrine: NC; Allergic/Immunologic: NC    MEDICATIONS  (STANDING):    MEDICATIONS  (PRN):      Vital Signs Last 24 Hrs  T(C): 36.8 (24 Feb 2025 15:29), Max: 36.8 (24 Feb 2025 15:29)  T(F): 98.3 (24 Feb 2025 15:29), Max: 98.3 (24 Feb 2025 15:29)  HR: 70 (24 Feb 2025 15:29) (70 - 70)  BP: 101/62 (24 Feb 2025 15:29) (101/62 - 122/75)  BP(mean): --  RR: 20 (24 Feb 2025 15:29) (16 - 20)  SpO2: 99% (24 Feb 2025 15:29) (99% - 99%)    Parameters below as of 24 Feb 2025 15:29  Patient On (Oxygen Delivery Method): room air      _________________  PHYSICAL EXAM:  ---------------------------  GEN: NAD; NC/AT; A and O x 3  HEENT: MMM; PERRLA: EOMI  LUNGS: no wheezing; decreased bilateral air entry; no use of accessory muscles for breathing  HEART: Nl S1 S2; no M   ABDOMEN: Soft, Nontender, non distended  EXTREMITIES: no cyanosis; no edema; warm and dry  SKIN: Warm and dry  NERVOUS SYSTEM: no focal neuro  deficits    _________________________________________________  LABS:                        7.5    5.87  )-----------( 376      ( 24 Feb 2025 13:26 )             22.8     02-24    134[L]  |  101  |  16  ----------------------------<  114[H]  4.2   |  23  |  0.95    Ca    8.9      24 Feb 2025 13:26    TPro  6.2  /  Alb  3.5  /  TBili  0.2  /  DBili  x   /  AST  11  /  ALT  18  /  AlkPhos  32[L]  02-24    PT/INR - ( 24 Feb 2025 14:15 )   PT: 11.7 sec;   INR: 1.00 ratio         PTT - ( 24 Feb 2025 14:15 )  PTT:27.0 sec  CAPILLARY BLOOD GLUCOSE      POCT Blood Glucose.: 97 mg/dL (24 Feb 2025 16:53)  POCT Blood Glucose.: 120 mg/dL (24 Feb 2025 12:02)      A/P    Problem #1 Gastric ca - likely advanced; pt declined tumor directed therapy including chemoIO as well as surgery  -pt requesting only comfort oriented measures including transition to hospice   -pt appears to have capacity  -palliative care input appreciated    I have examined the patient at bedside and reviewed patient's data and participated in the management of the patient along with ACP as well as hematology/medical oncology faculty consisting of [place] Felipe, Gualberto, [place], Manuel as well as myself during the daily review. I reviewed pertinent clinical information, PE,  labs as well as A/P as outline above.     Call with questions [phone]    Christian Lay MD

## 2025-02-24 NOTE — CONSULT NOTE ADULT - CONVERSATION DETAILS
Spoke with Laly Howell in ED today via [employer]  [employer] #[id number]. Mr. Howell is able to make his own healthcare decisions but asked that I speak with Laly as he was tired.     We reviewed their wishes from a prior hospitalization earlier this month in which Mr. Howell said he did not want to pursue cancer-directed treatment and wanted to be DNR/DNI. This is still the case.    Laly says that they really need a wheelchair because Mr. Howell is too weak to walk very far with a walker. SHe is interested in hearing more about hospice services to see if hospice could support her care for him at home. SHe has a few hours of HHA but could use more.     (SANJIV Farrell saw them and explained hospice and they are still not ready for this step as they want to be able to pursue IV fluids, IV antibiotics and blood transfusions, if they can make Mr. PALACIO feel better)     I asked about who is the primary doctor for Mr. PALACIO now and Laly said it would be his PCP but she is having trouble getting him to the doctor. We agreed that Dr. Mcmanus, the oncologist, is probably not going to be able to coordinate things going forward if he does not want cancer-directed therapy.     I suggested that [facility], a group that makes home visits and has several providers who speak Iranian, might be a good fit and Laly agreed that this could work. I reached out to [facility] and made referral and also gave Laly the [facility] number to call to make connection.

## 2025-02-24 NOTE — ED PROVIDER NOTE - NSICDXPASTMEDICALHX_GEN_ALL_CORE_FT
PAST MEDICAL HISTORY:  Adenoma     BPH (benign prostatic hyperplasia)     DM (diabetes mellitus)     Gastric adenocarcinoma     Gastric reflux     Hepatitis C     HLD (hyperlipidemia)     HTN (hypertension)     Liver cirrhosis     Prostate cancer

## 2025-02-24 NOTE — ED PROVIDER NOTE - PROGRESS NOTE DETAILS
Fred Montemayor, EM NP Fellow: Labs pertinent for hemoglobin of 7.5 hematocrit 22.8 which is at patient's baseline from previous discharge 2 weeks ago.  Labs otherwise nonactionable.  Patient tolerating ice chips and water without nausea or vomiting. Per extensive discussion with patient and wife patient would like to be discharged home.  Patient provided information for primary care home health service by Dr. Fleischer Black.  Zofran prescription sent to patient's preferred pharmacy for nausea.  Patient was offered and declined transport home, per wife they endorsed they will get uber home.  Patient and wife provided strict return precautions, provided extensive time to ask questions all which were answered at the bedside by me.  Discussion of results, follow ups, and disposition obtained via assistance of [employer]  ([place]) ID# ([id number]).

## 2025-02-24 NOTE — ED PROVIDER NOTE - CLINICAL SUMMARY MEDICAL DECISION MAKING FREE TEXT BOX
DINORA Bhandari NP Fellow: DINORA Bhandari NP Fellow: Patient is a 80-year-old male PMH HTN, IBS, HLD, GERD, DM, BPH, prostate cancer, gastric adenocarcinoma, anxiety, depression, presents to ER with wife with complaint of dizziness and nausea since discharge from this hospital 2 weeks ago, acutely worse for 2 to 3 days.  Patient endorses no oral intake for 3 days 2/2 he vomits every time he tries to eat or drink anything.  Patient denies fevers, chills, abdominal pain, chest pain, difficulty breathing, dysuria.    Patient is nontoxic but unwell appearing.  Physical exam as above pertinent for pale skin, abdomen is soft, nondistended, Non-TTP, lungs CTA B–L, heart RRR.  Patient likely with worsening symptoms of known gastric cancer for which she is not seeking treatment versus anemia versus ACS.  Less concern for acute intracranial pathology in the setting of no focal neurologic deficits.  Will order labs, Zofran for nausea, IV fluid, and reassess.  Per shared decision making discussion with patient and wife they do not want any imaging if possible at this time. Patient to remain DNR/DNI, patient and wife would like to meet with palliative care team to discuss home hospice. MD Fleischer-Black present in ER to discuss palliative care/hospice with family.   Disposition pending results and reassessment, patient will likely be admitted to hospital vs sent home with home hospice.  Patient is Libyan speaking male, history and physical obtained via assistance of [employer]  ([place]) ID# ([id number]).

## 2025-02-24 NOTE — ED PROVIDER NOTE - PATIENT PORTAL LINK FT
You can access the FollowMyHealth Patient Portal offered by [facility] by registering at the following website: [email or web address]. By joining [employer]’s FollowMyHealth portal, you will also be able to view your health information using other applications (apps) compatible with our system.

## 2025-02-24 NOTE — CONSULT NOTE ADULT - ASSESSMENT
complete note to follow    #VTE Prophylaxis   80-year-old Cypriot-speaking man from home, walks with assistance/needs walker/wheelchair, with hypertension, IBS, hyperlipidemia, anxiety/depression, GERD, diabetes, BPH, hx prostate cancer, and metastatic gastric adenocarcinoma diagnosed in August 2024 coming for a week of nausea, generalized weakness, decreased p.o. intake, and palpitations. He has lost about 16 pounds in 2 months. He was most recently seen by [facility] in October 2024 and decided to opt out of treatment for adenocarcinoma due to advanced age. On examination he is AO4 but weak and pale. He denies any hematemesis, melena, hematuria, or nosebleeds. Hemoglobin was noted to be 5.1.  Per paperwork family brought in earlier, he is DNI/DNR and does not want artificial nutrition/hydration/antibiotics once he has reached a terminal state.  Today, however, they would like all medical intervention except for intubation and compressions.    #Gastric CA locally advanced, poss metastatic  #Adenocarcinoma of Appendix, pt refused staging surgery   #GONZALO  #Hx Prostate CA, follows with Urology  pt follows with our colleague Dr. Mcmanus and refused staging EUS for gastric CA and does not want chemo  Hgb=7.5  Baseline Hgb 9-12, most recent Hgb 10/15/24 was 12.6  Rec's:  drop in Hgb 12.6 to 7.5 since 10/2024, obtain most recent CBC from PCP  -symptom mgmt  -anemia w/u iron panel, B12, folate, LDH, hapto, retic, TSH  -transfuse 1 unit PRBC  -consider head CT if dizziness does not resolve after transfusion  -Pall Care consult  -likely POD and may be hospice candidate    #VTE Prophylaxis    Thank you for the referral. Will continue to monitor the patient.  Please call with any questions [phone]  Above reviewed with Attending Dr. Lay  [facility]  [address], [address], [address]th [place] [address]  [phone]  *Note not finalized until signed by Attending Physician

## 2025-02-24 NOTE — CONSULT NOTE ADULT - SUBJECTIVE AND OBJECTIVE BOX
Consult to: Discuss complex medical decision making related to goals of care    [facility] Geriatric and Palliative Consult Service:  Whit Hinkle DO: cell ([phone])  Soham Sterling MD: cell ([phone])  Neymar Mclain NP: cell ([phone])   Piero Farrell LMSW: cell ([phone])   Jessica Fleischer-Black, MD: cell ([phone])    Can contact any Palliative Team member via Microsoft Teams for consults and questions    HPI: 80 M with hx of Gastric cancer stage 3-4 (has not completed staging w/u) presents to the [facility] ED with c/o weakness and vomiting at home since d/c from hospital 2 weeks ago. Was seen by Palliative Care at that time  articulated that he does not want to pursue cancer-directed care, does want to be DNR/DNI but was not ready for hospice yet. In the ED today, he and his wife expressed interest in hearing more about hospice services       PAST MEDICAL & SURGICAL HISTORY:  HTN (hypertension)      HLD (hyperlipidemia)      Liver cirrhosis      Adenoma      Gastric reflux      DM (diabetes mellitus)      Hepatitis C      Gastric adenocarcinoma      Prostate cancer      BPH (benign prostatic hyperplasia)      History of appendectomy      H/O cataract extraction          SOCIAL HISTORY:    Admitted from:  home  (with HHA)     [ none ] Substance abuse, [ none ] Tobacco hx, [ none ] Alcohol hx, [ none ] Home Opioid Hx  L[employer]-day: Latter day  Language preferred: Belizean    FAMILY HISTORY:   N/A  Baseline ADLs (prior to admission): Needs assistance with all ADLs    Allergies    No Known Allergies    Intolerances      Review of Systems: All others negative  Present Symptoms: Mild  Pain: denies             Location -                               Aggravating factors -             Quality -             Radiation -             Timing-             Severity (0-10 scale):             Minimal acceptable level (0-10 scale):  Fatigue: yes  Nausea: yes  Lack of Appetite:   SOB:  Depression:  Anxiety:  Constipation:     CPOT:    https://ccs-sth.org/resources/Documents/Sedation%20Analgesia%20Delirium%20in%20CC/CCS%20STH%20Guideline%20template%20CPOT.pdf  PAIN AD Score:   [email or web address] (press ctrl +  left click to view)      MEDICATIONS  (STANDING):  sodium chloride 0.9% Bolus 500 milliLiter(s) IV Bolus once    MEDICATIONS  (PRN):      PHYSICAL EXAM:  Vital Signs Last 24 Hrs  T(C): 36.7 (24 Feb 2025 11:18), Max: 36.7 (24 Feb 2025 11:18)  T(F): 98.1 (24 Feb 2025 11:18), Max: 98.1 (24 Feb 2025 11:18)  HR: 70 (24 Feb 2025 11:18) (70 - 70)  BP: 122/75 (24 Feb 2025 11:18) (122/75 - 122/75)  BP(mean): --  RR: 16 (24 Feb 2025 11:18) (16 - 16)  SpO2: 99% (24 Feb 2025 11:18) (99% - 99%)    Parameters below as of 24 Feb 2025 11:18  Patient On (Oxygen Delivery Method): room air        General: alert  oriented x 3 verbal    Palliative Performance Scale/Karnofsky Score: 40%  http://npcrc.org/files/news/palliative_performance_scale_ppsv2.pdf    HEENT: no abnormal lesion  Lungs: CTA b/l  CV: RRR, S1S2  GI: soft non distended non tender    : icontinent  Musculoskeletal: weakness x4  ambulatory with assistance   Skin: no abnormal skin lesions :   Neuro: no deficits  Oral intake ability:  full capability    LABS:                        7.5    5.87  )-----------( 376      ( 24 Feb 2025 13:26 )             22.8     02-24    134[L]  |  101  |  16  ----------------------------<  114[H]  4.2   |  23  |  0.95    Ca    8.9      24 Feb 2025 13:26    TPro  6.2  /  Alb  3.5  /  TBili  0.2  /  DBili  x   /  AST  11  /  ALT  18  /  AlkPhos  32[L]  02-24    Urinalysis Basic - ( 24 Feb 2025 13:26 )    Color: x / Appearance: x / SG: x / pH: x  Gluc: 114 mg/dL / Ketone: x  / Bili: x / Urobili: x   Blood: x / Protein: x / Nitrite: x   Leuk Esterase: x / RBC: x / WBC x   Sq Epi: x / Non Sq Epi: x / Bacteria: x        RADIOLOGY & ADDITIONAL STUDIES: Reviewed     [facility] Geriatric and Palliative Consult Service:  Whit Hinkle DO: cell ([phone])  Soham Sterling MD: cell ([phone])  Neymar Mclain NP: cell ([phone])   Piero Farrell SW: cell ([phone])   Jessica Fleischer-Black, MD: cell ([phone])    Can contact any Palliative Team member via Microsoft Teams for consults and questions    HPI: 80 M with hx of 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 Was seen by Palliative Care at that time  articulated that he does not want to pursue cancer-directed care, does want to be DNR/DNI but was not ready for hospice yet. In the ED today, he and his wife expressed interest in hearing more about hospice services       PAST MEDICAL & SURGICAL HISTORY:  HTN (hypertension)      HLD (hyperlipidemia)      Liver cirrhosis      Adenoma      Gastric reflux      DM (diabetes mellitus)      Hepatitis C      Gastric adenocarcinoma      Prostate cancer      BPH (benign prostatic hyperplasia)      History of appendectomy      H/O cataract extraction          SOCIAL HISTORY:    Admitted from:  home  (with HHA)     [ none ] Substance abuse, [ none ] Tobacco hx, [ none ] Alcohol hx, [ none ] Home Opioid Hx  [employer]: [employer]  Language preferred: Northern Irish    FAMILY HISTORY:   N/A  Baseline ADLs (prior to admission): Needs assistance with all ADLs    Allergies    No Known Allergies    Intolerances      Review of Systems: All others negative  Present Symptoms: Mild  Pain: denies             Location -                               Aggravating factors -             Quality -             Radiation -             Timing-             Severity (0-10 scale):             Minimal acceptable level (0-10 scale):  Fatigue: yes  Nausea: yes  Lack of Appetite:   SOB:  Depression:  Anxiety:  Constipation:     CPOT:    https://ccs-sth.org/resources/Documents/Sedation%20Analgesia%20Delirium%20in%20CC/CCS%20STH%20Guideline%20template%20CPOT.pdf  PAIN AD Score:   http://[email or web address] (press ctrl +  left click to view)      MEDICATIONS  (STANDING):  sodium chloride 0.9% Bolus 500 milliLiter(s) IV Bolus once    MEDICATIONS  (PRN):      PHYSICAL EXAM:  Vital Signs Last 24 Hrs  T(C): 36.7 (24 Feb 2025 11:18), Max: 36.7 (24 Feb 2025 11:18)  T(F): 98.1 (24 Feb 2025 11:18), Max: 98.1 (24 Feb 2025 11:18)  HR: 70 (24 Feb 2025 11:18) (70 - 70)  BP: 122/75 (24 Feb 2025 11:18) (122/75 - 122/75)  BP(mean): --  RR: 16 (24 Feb 2025 11:18) (16 - 16)  SpO2: 99% (24 Feb 2025 11:18) (99% - 99%)    Parameters below as of 24 Feb 2025 11:18  Patient On (Oxygen Delivery Method): room air        General: alert  oriented x 3 verbal    Palliative Performance Scale/Karnofsky Score: 40%  http://npcrc.org/files/news/palliative_performance_scale_ppsv2.pdf    HEENT: no abnormal lesion  Lungs: CTA b/l  CV: RRR, S1S2  GI: soft non distended non tender    : continent  Musculoskeletal: weakness x4  ambulatory with assistance   Skin: no abnormal skin lesions :   Neuro: no deficits  Oral intake ability:  full capability    LABS:                        7.5    5.87  )-----------( 376      ( 24 Feb 2025 13:26 )             22.8     02-24    134[L]  |  101  |  16  ----------------------------<  114[H]  4.2   |  23  |  0.95    Ca    8.9      24 Feb 2025 13:26    TPro  6.2  /  Alb  3.5  /  TBili  0.2  /  DBili  x   /  AST  11  /  ALT  18  /  AlkPhos  32[L]  02-24    Urinalysis Basic - ( 24 Feb 2025 13:26 )    Color: x / Appearance: x / SG: x / pH: x  Gluc: 114 mg/dL / Ketone: x  / Bili: x / Urobili: x   Blood: x / Protein: x / Nitrite: x   Leuk Esterase: x / RBC: x / WBC x   Sq Epi: x / Non Sq Epi: x / Bacteria: x        RADIOLOGY & ADDITIONAL STUDIES: Reviewed

## 2025-02-24 NOTE — ED PROVIDER NOTE - NSFOLLOWUPINSTRUCTIONS_ED_ALL_ED_FT
- You were seen in the ER today weakness, decreased oral intake.     - Labs were performed, see results attached below.    - Please follow up with your primary care doctor in the next 48-72 hours, bring a copy of your results.     - Please call contact information provided to your by team to set up primary care in the home.     - Patient return to the ER for severe abdominal pain, uncontrolled vomiting, blood in the vomit or stool, shortness of breath, palpitations, lightheadedness, dizziness, passing out, no urine output in more than 12 hours, change in behavior or mental status, or any other concerns.    - ??? ??????? ???????? ? ???????? ?????, ????????, ???????? ???????????? ??????.     - ???? ????????? ???????????? ????????????, ?????????? ??. ????.    - ??????????, ?????????? ? ?????? ???????? ????? ? ??????? ????????? 48–72 ????? ? ????????? ????? ????? ???????????.     - ??????????, ????????? ?? ?????????? ??????????, ??????????????? ????? ????????, ????? ???????????? ????????? ?????? ?? ????.     - ??????? ???????????? ? ????????? ?????????? ?????? ??? ??????? ???? ? ??????, ???????????????? ?????, ????? ? ????? ??? ?????, ??????, ????????????, ???????, ??????????????, ????????, ?????????? ????????? ???? ????? 12 ?????, ????????? ????????? ??? ???????????? ????????? ??? ????? ?????? ?????????.  - Vas segodnya zametili v priyemnom pokoye, slabost', snizheniye peroral'nogo priyema.     - Byli provedeny laboratornyye issledovaniya, rezul'vasiliy sm. nizhe.    - Pozhaluysta, obratites' k svoyemu lechashchemu vrachu v techeniye sleduyBaptist Health Corbinkh 48–72 chasov i prinesite kopiyu svoikh rezul'tatov.     - Pozhaluysta, pozvonite po kontaktnoy informatsii, predostavlennoy vashey komandoy, chtoby organizovat' pervichnuyu pomoshch' na domu.     - Patsiyent vozvrashchayetsya v otdeleniye neotlozhnoy pomoshchi estefany dana'stew boli v zhivote, nekontroliruyemoy rvote, krovi v rvote eboni stule, odyshke, serdtsebiyenii, durnote, golovokruzhenii, obmoroke, otsutstvii vydeleniya moCHI St. Alexius Health Bismarck Medical Centereye [address] eboni virgil paulino eboni tesfayedeyanira collierSullivan County Community Hospital.

## 2025-02-24 NOTE — CONSULT NOTE ADULT - SUBJECTIVE AND OBJECTIVE BOX
MEDICATIONS  (STANDING):  ondansetron Injectable 4 milliGRAM(s) IV Push once  sodium chloride 0.9% Bolus 500 milliLiter(s) IV Bolus once    MEDICATIONS  (PRN):    CAPILLARY BLOOD GLUCOSE      POCT Blood Glucose.: 120 mg/dL (24 Feb 2025 12:02)    I&O's Summary      PHYSICAL EXAM:  Vital Signs Last 24 Hrs  T(C): 36.7 (24 Feb 2025 11:18), Max: 36.7 (24 Feb 2025 11:18)  T(F): 98.1 (24 Feb 2025 11:18), Max: 98.1 (24 Feb 2025 11:18)  HR: 70 (24 Feb 2025 11:18) (70 - 70)  BP: 122/75 (24 Feb 2025 11:18) (122/75 - 122/75)  BP(mean): --  RR: 16 (24 Feb 2025 11:18) (16 - 16)  SpO2: 99% (24 Feb 2025 11:18) (99% - 99%)    Parameters below as of 24 Feb 2025 11:18  Patient On (Oxygen Delivery Method): room air          LABS:                        7.5    5.87  )-----------( 376      ( 24 Feb 2025 13:26 )             22.8     02-24    134[L]  |  101  |  16  ----------------------------<  114[H]  4.2   |  23  |  0.95    Ca    8.9      24 Feb 2025 13:26    TPro  6.2  /  Alb  3.5  /  TBili  0.2  /  DBili  x   /  AST  11  /  ALT  18  /  AlkPhos  32[L]  02-24          Urinalysis Basic - ( 24 Feb 2025 13:26 )    Color: x / Appearance: x / SG: x / pH: x  Gluc: 114 mg/dL / Ketone: x  / Bili: x / Urobili: x   Blood: x / Protein: x / Nitrite: x   Leuk Esterase: x / RBC: x / WBC x   Sq Epi: x / Non Sq Epi: x / Bacteria: x            RADIOLOGY & ADDITIONAL TESTS:       Reason for Admission: anemia requiring transfusion, FTT ISO metastatic cancer  History of Present Illness:   80-year-old [place] citizen-speaking man from home, walks with assistance/needs walker/wheelchair, with hypertension, IBS, hyperlipidemia, anxiety/depression, GERD, diabetes, BPH, hx prostate cancer, and metastatic gastric adenocarcinoma diagnosed in August 2024 coming for a week of nausea, generalized weakness, decreased p.o. intake, and palpitations. He has lost about 16 pounds in 2 months. He was most recently seen by A in October 2024 and decided to opt out of treatment for adenocarcinoma due to advanced age. On examination he is AO4 but weak and pale. He denies any hematemesis, melena, hematuria, or nosebleeds. Hemoglobin was noted to be 5.1.  Per paperwork family brought in earlier, he is DNI/DNR and does not want artificial nutrition/hydration/antibiotics once he has reached a terminal state.  Today, however, they would like all medical intervention except for intubation and compressions.    REVIEW OF SYSTEMS:    CONSTITUTIONAL: No fever, + loss of appetite. no chills, + weight loss, +fatigue, weakness (unable to ambulate as per wife)  EYES: no acute visual disturbances  NECK: No pain or stiffness  RESPIRATORY: No cough; No shortness of breath  CARDIOVASCULAR: No chest pain, no palpitations  GASTROINTESTINAL: No pain. No nausea or vomiting; No diarrhea   NEUROLOGICAL: +dizziness, No headache or numbness  MUSCULOSKELETAL: No joint pain, no muscle pain  GENITOURINARY: no dysuria, no frequency, no hesitancy  PSYCHIATRY: no depression, no anxiety  ALL OTHER  ROS negative    #[id number]        Allergies and Intolerances:        Allergies:  	No Known Allergies:     Home Medications:   * Patient Currently Takes Medications as of 03-Jul-2024 16:18 documented in Structured Notes  · 	lisinopril 10 mg oral tablet: 1 tab(s) orally once a day  · 	Olanzapine:   · 	paliperidone:   · 	aspirin 81 mg oral tablet, chewable: 1 tab(s) chewed once a day  · 	omeprazole 20 mg oral delayed release capsule: 1 cap(s) orally once a day  · 	amLODIPine 5 mg oral tablet: 1 tab(s) orally once a day  · 	metFORMIN 500 mg oral tablet: 1 tab(s) orally 2 times a day  · 	tamsulosin 0.4 mg oral capsule: 1 cap(s) orally once a day  · 	rosuvastatin 10 mg oral capsule: 1 cap(s) orally once a day  · 	Linzess 72 mcg oral capsule: 1 cap(s) orally once a day  · 	Gemtesa 75 mg oral tablet: 1 tab(s) orally once a day    .    Patient History:   Social History:  · Substance use	No    Tobacco Screening:  · Core Measure Site	Yes  · Has the patient used tobacco in the past 30 days?	No      MEDICATIONS  (STANDING):  ondansetron Injectable 4 milliGRAM(s) IV Push once  sodium chloride 0.9% Bolus 500 milliLiter(s) IV Bolus once    MEDICATIONS  (PRN):    CAPILLARY BLOOD GLUCOSE      POCT Blood Glucose.: 120 mg/dL (24 Feb 2025 12:02)    I&O's Summary      PHYSICAL EXAM:  Vital Signs Last 24 Hrs  T(C): 36.7 (24 Feb 2025 11:18), Max: 36.7 (24 Feb 2025 11:18)  T(F): 98.1 (24 Feb 2025 11:18), Max: 98.1 (24 Feb 2025 11:18)  HR: 70 (24 Feb 2025 11:18) (70 - 70)  BP: 122/75 (24 Feb 2025 11:18) (122/75 - 122/75)  BP(mean): --  RR: 16 (24 Feb 2025 11:18) (16 - 16)  SpO2: 99% (24 Feb 2025 11:18) (99% - 99%)    Parameters below as of 24 Feb 2025 11:18  Patient On (Oxygen Delivery Method): room air      GEN: NAD; A and O x 3, pallor, weakness  LUNGS: CTA B/L  HEART: S1 S2  ABDOMEN: soft, non-tender, non-distended, + BS  EXTREMITIES: RUE/DOMINGO resting tremors, no edema        LABS:                        7.5    5.87  )-----------( 376      ( 24 Feb 2025 13:26 )             22.8     02-24    134[L]  |  101  |  16  ----------------------------<  114[H]  4.2   |  23  |  0.95    Ca    8.9      24 Feb 2025 13:26    TPro  6.2  /  Alb  3.5  /  TBili  0.2  /  DBili  x   /  AST  11  /  ALT  18  /  AlkPhos  32[L]  02-24          Urinalysis Basic - ( 24 Feb 2025 13:26 )    Color: x / Appearance: x / SG: x / pH: x  Gluc: 114 mg/dL / Ketone: x  / Bili: x / Urobili: x   Blood: x / Protein: x / Nitrite: x   Leuk Esterase: x / RBC: x / WBC x   Sq Epi: x / Non Sq Epi: x / Bacteria: x            RADIOLOGY & ADDITIONAL TESTS:  none today, but prior CT  < from: CT Angio Chest PE Protocol w/ IV Cont (02.09.25 @ 16:00) >  IMPRESSION:    No pulmonary embolism.    A few new subcentimeter lung nodules due to airway disease or metastasis.   Recommend short-term follow-up in 3 months.    Gastric wall thickening. A new subcentimeter perigastric lymph node.    No acute abnormality in the abdomen and pelvis.    < end of copied text >

## 2025-02-24 NOTE — CONSULT NOTE ADULT - CONSULT REASON
Gastric CA, Anemia
Consult to: Discuss complex medical decision making related to goals of care in patent with advanced cancer

## 2025-02-24 NOTE — ED ADULT NURSE NOTE - OBJECTIVE STATEMENT
pt c/o nausea/ dizziness x2 weeks w/ loss of appetite x3 day. pt denies numbness/tingling/fever/chills . pt pmh: prostate cancer, htn,

## 2025-02-24 NOTE — CONSULT NOTE ADULT - PROBLEM SELECTOR RECOMMENDATION 9
Stage II-IV. Has not completed staging w/u  Not interested in Stage II-IV. Has not completed staging w/u  Not interested in further cancer-directed work-up  Likely hospice-appropriate    d/w CHERELLE Ibarra in ED and CORAL Lopes and Dr. Lay from heme/onc Stage II-IV. Has not completed staging w/u  Not interested in further cancer-directed work-up  Likely hospice-appropriate  HIE chart reviewed    d/w CHERELLE Ibarra in ED and CORAL Lopes and Dr. Lay from heme/onc

## 2025-02-24 NOTE — CONSULT NOTE ADULT - ASSESSMENT
80-year-old Egyptian-speaking man from home, walks with assistance/needs walker/wheelchair, with hypertension, IBS, hyperlipidemia, anxiety/depression, GERD, diabetes, BPH, hx prostate cancer, and metastatic gastric adenocarcinoma diagnosed in August 2024presenting to the ED with weakness and nausea. Considering home hospice.

## 2025-02-24 NOTE — CONSULT NOTE ADULT - PROBLEM SELECTOR RECOMMENDATION 2
Spenser Howell is able to make healthcare decisions for himself at this time. He trusts his wife of 56 y (Laly) to make decisions as well.     DNR/DNI

## 2025-02-25 LAB
FERRITIN SERPL-MCNC: 10 NG/ML — LOW (ref 30–400)
FOLATE SERPL-MCNC: 11.6 NG/ML — SIGNIFICANT CHANGE UP
HAPTOGLOB SERPL-MCNC: 91 MG/DL — SIGNIFICANT CHANGE UP (ref 34–200)
VIT B12 SERPL-MCNC: >2000 PG/ML — HIGH (ref 232–1245)

## (undated) DEVICE — TUBING ENDO EXT OLYMPUS 160 24HR USE GI

## (undated) DEVICE — CONTAINER FORMALIN 10% 20ML

## (undated) DEVICE — TUBING IV SET GRAVITY 1Y 78" MACRO

## (undated) DEVICE — DRSG CURITY GAUZE SPONGE 4 X 4" 12-PLY NON-STERILE

## (undated) DEVICE — FORCEP RADIAL JAW 4 W NDL 2.2MM 2.8MM 240CM ORANGE DISP

## (undated) DEVICE — SCOPE WARMER SEAL DISP

## (undated) DEVICE — TUBING CANNULA SALTER LABS NASAL ADULT 7FT

## (undated) DEVICE — CATH BLLN ULTRASONIC ENSOSCOPE

## (undated) DEVICE — FORCEP RADIAL JAW 4 JUMBO NO NDL DISP

## (undated) DEVICE — DENTURE CUP PINK

## (undated) DEVICE — MASK LRG MED AND HIGH O2 CONC M TO M 10FT

## (undated) DEVICE — VENODYNE/SCD SLEEVE CALF MEDIUM

## (undated) DEVICE — SPONGE ENDO PEANUT 5MM

## (undated) DEVICE — DRSG MASTISOL

## (undated) DEVICE — LABELS BLANK W PEN

## (undated) DEVICE — NDL HYPO SAFE 22G X 1" (BLACK)

## (undated) DEVICE — DRAPE LIGHT HANDLE COVER (BLUE)

## (undated) DEVICE — CATH IV SAFE BC 22G X 1" (BLUE)

## (undated) DEVICE — ELCTR GROUNDING PAD ADULT COVIDIEN

## (undated) DEVICE — GLV 7 PROTEXIS (BLUE)

## (undated) DEVICE — SOL INJ NS 0.9% 500ML 1-PORT

## (undated) DEVICE — SALIVA EJECTOR (BLUE)

## (undated) DEVICE — ANESTHESIA CIRCUIT ADULT HMEF

## (undated) DEVICE — SOLIDIFIER ISOLYZER 2000 CC

## (undated) DEVICE — SYR LUER LOK 3CC

## (undated) DEVICE — PACK IV START WITH CHG

## (undated) DEVICE — DRSG 2X2

## (undated) DEVICE — UNDERPAD LINEN SAVER 17 X 24"

## (undated) DEVICE — KIT ENDO PROCEDURE CUST W/VLV

## (undated) DEVICE — Device

## (undated) DEVICE — DRSG BANDAID 0.75X3"

## (undated) DEVICE — PACK GENERAL LAPAROSCOPY

## (undated) DEVICE — NDL HYPO REGULAR BEVEL 25G X 1.5" (BLUE)

## (undated) DEVICE — GOWN LG

## (undated) DEVICE — BITE BLOCK ADULT 20 X 27MM (GREEN)

## (undated) DEVICE — WARMING BLANKET FULL ADULT

## (undated) DEVICE — SUT POLYSORB 0 30" GU-46

## (undated) DEVICE — SOL IRR POUR NS 0.9% 500ML

## (undated) DEVICE — FOR-ESU VALLEYLAB T7E15009DX: Type: DURABLE MEDICAL EQUIPMENT

## (undated) DEVICE — SOL IRR POUR NS 0.9% 1500ML

## (undated) DEVICE — BIOPSY FORCEP RADIAL JAW 4 STANDARD WITH NEEDLE

## (undated) DEVICE — SOLIDIFIER 1200CC

## (undated) DEVICE — ENDOCATCH 10MM SPECIMEN POUCH

## (undated) DEVICE — STAPLER COVIDIEN ENDO GIA STANDARD HANDLE

## (undated) DEVICE — TUBING MEDI-VAC W MAXIGRIP CONNECTORS 1/4"X6'

## (undated) DEVICE — SUT POLYSORB 4-0 27" P-12 UNDYED

## (undated) DEVICE — GLV 7 PROTEXIS (WHITE)